# Patient Record
Sex: FEMALE | Race: WHITE
[De-identification: names, ages, dates, MRNs, and addresses within clinical notes are randomized per-mention and may not be internally consistent; named-entity substitution may affect disease eponyms.]

---

## 2019-01-09 ENCOUNTER — HOSPITAL ENCOUNTER (OUTPATIENT)
Dept: HOSPITAL 92 - SDC | Age: 79
Discharge: HOME | End: 2019-01-09
Attending: INTERNAL MEDICINE
Payer: MEDICARE

## 2019-01-09 DIAGNOSIS — F32.9: ICD-10-CM

## 2019-01-09 DIAGNOSIS — Z79.83: ICD-10-CM

## 2019-01-09 DIAGNOSIS — E11.9: ICD-10-CM

## 2019-01-09 DIAGNOSIS — K31.7: ICD-10-CM

## 2019-01-09 DIAGNOSIS — I48.91: ICD-10-CM

## 2019-01-09 DIAGNOSIS — Z79.899: ICD-10-CM

## 2019-01-09 DIAGNOSIS — F25.9: ICD-10-CM

## 2019-01-09 DIAGNOSIS — Z79.82: ICD-10-CM

## 2019-01-09 DIAGNOSIS — I69.354: ICD-10-CM

## 2019-01-09 DIAGNOSIS — K44.9: ICD-10-CM

## 2019-01-09 DIAGNOSIS — F41.9: ICD-10-CM

## 2019-01-09 DIAGNOSIS — E78.5: ICD-10-CM

## 2019-01-09 DIAGNOSIS — Z79.84: ICD-10-CM

## 2019-01-09 DIAGNOSIS — I10: ICD-10-CM

## 2019-01-09 DIAGNOSIS — K22.2: Primary | ICD-10-CM

## 2019-01-09 PROCEDURE — 0D757ZZ DILATION OF ESOPHAGUS, VIA NATURAL OR ARTIFICIAL OPENING: ICD-10-PCS | Performed by: INTERNAL MEDICINE

## 2019-01-09 PROCEDURE — 0DJ08ZZ INSPECTION OF UPPER INTESTINAL TRACT, VIA NATURAL OR ARTIFICIAL OPENING ENDOSCOPIC: ICD-10-PCS | Performed by: INTERNAL MEDICINE

## 2019-01-09 NOTE — HP
HISTORY OF PRESENT ILLNESS:  This is an 79-year-old  female who is a

nursing home resident.  The patient was referred to me by Dr. Faheem Montaño 
because

of dysphagia.  The patient is not a good historian; however, she has had the

dysphagia of recent onset.  The dysphagia occurs mostly with the solid foods 
like

meat and bread.  She had no painful swallowing.  She had undergone EGD because 
of

dysphagia. 



ALLERGIES:  SEROQUEL.



MEDICAL ILLNESSES:  

1. Hypertension.

2. Diabetes mellitus.

3. Schizoaffective disorder.

4. Atrial fibrillation.  

5. 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 

6. Depression.

7. Chronic anxiety.

8. Hyperlipidemia.



PHYSICAL EXAMINATION:

GENERAL:  Appears comfortable. 

VITAL SIGNS:  Pulse is 70, blood pressure 130/80. 

HEENT:  Conjunctivae are clear.   

CARDIOVASCULAR SYSTEM:  First and second heart sounds heard. 

LUNGS:  Clear to auscultation. 

ABDOMEN:  Soft.  Abdomen is nontender.  No organomegaly or masses. Bowel sounds 
are

normal. 



ADMITTING DIAGNOSES:  An 79-year-old  female with dysphagia.



PLAN:  EGD and possible dilation.







Job ID:  [id number]



[facility] none

## 2019-01-10 NOTE — OP
DATE OF PROCEDURE:  01/09/2019



OPERATIVE PROCEDURE:  

1. Esophagogastroduodenoscopy.

2. Esophageal dilation with Corona size 48 _ [place] citizen in diameter.



PREOPERATIVE DIAGNOSIS:  Dysphagia.



POSTOPERATIVE DIAGNOSES:  

1. No definite esophageal narrowing is seen, although she had a partial ring at 
the

mid esophagus. 

2. Hiatus hernia.

3. Gastritis, gastric polyp.

4. Duodenal polyp.



DESCRIPTION OF PROCEDURE:  The patient was placed on her left lateral position 
and

was given sedation by Anesthesia Department. A Pentax video gastroscope under 
direct

vision was passed down the oropharynx to the GE junction into the stomach and

subsequently into the descending duodenum. The vocal cords appeared healthy. The

esophageal mucosa appeared normal throughout the esophagus. At mid esophagus, 
the

patient was found to have partial ring, it is found to be tight. The scope was

advanced down without resistance. The patient had a hiatus hernia. Retroflexion

failed to show any pathology in the fundus or cardia. The gastric body, gastric

antrum show mild gastritis and gastric polyp. The duodenal bulb shows a polyp 
in the

bulb. This appears endoscopically benign. No biopsy was taken. In the descending

duodenum, no pathology seen. The stomach was decompressed and the scope was 
removed.

Given history of dysphagia, it was elected to pass a _48 fr [place] citizen Corona

dilator, passed down with out resistance.



DISCHARGE PLANNING:  This is an 79-year-old  female came for an EGD 
because

of dysphagia. The dysphagia was of recent onset. The patient had no  esophageal
  

narrowing seen and a    48 [place] citizen Corona dilator passed down with no

resistance. 



DISCHARGE RECOMMENDATION:  

1. Resume diet as before.

2. To call me, if she develops any chest pain, hematemesis, melena, or fever.

3. To see me back in the clinic in 2 weeks. If her symptoms persist, we will

consider a barium swallow and possibly modified barium swallow. 







Job ID:  [id number]



[facility]

## 2019-03-25 ENCOUNTER — HOSPITAL ENCOUNTER (OUTPATIENT)
Dept: HOSPITAL 18 - NAV CT | Age: 79
Discharge: HOME | End: 2019-03-25
Attending: INTERNAL MEDICINE
Payer: MEDICARE

## 2019-03-25 DIAGNOSIS — J90: ICD-10-CM

## 2019-03-25 DIAGNOSIS — M89.9: ICD-10-CM

## 2019-03-25 DIAGNOSIS — K44.9: ICD-10-CM

## 2019-03-25 DIAGNOSIS — E27.9: ICD-10-CM

## 2019-03-25 DIAGNOSIS — R79.89: Primary | ICD-10-CM

## 2019-03-25 PROCEDURE — 74177 CT ABD & PELVIS W/CONTRAST: CPT

## 2019-03-25 NOTE — CT
CT ABDOMEN AND PELVIS WITH IV CONTRAST:

 

HISTORY: 

Elevated LFTs, dementia, and Parkinson's disease.

 

COMPARISON: 

Comparison is made to the CT stone protocol of 4/30/2011.

 

FINDINGS: 

A moderate to large hiatal hernia is again seen.  There are bilateral pleural effusions, right larger
 than left, with adjacent consolidated changes on the right.  The patient is post cholecystectomy. 

 

No hepatic mass or abnormal biliary ductal dilatation is seen.  The liver measures 16.8 cm in length.
  The spleen, pancreas, right adrenal gland, and kidneys are normal.  A 1.5 cm left adrenal nodule is
 stable.

 

No free air, free fluid, or lymphadenopathy is seen in the abdomen or pelvis.  There are vascular yvonne
cifications without evidence of aneurysmal dilatation of the abdominal aorta.  Degenerative changes a
re present in the spine.  There are postop changes and metallic hardware in the proximal femurs.  The
 small bowel loops are not abnormally dilated.  There is fecal material in the rectosigmoid.

 

There is a 3.5 cm fluid collection posterior to the right proximal femur.  This may be due to postop 
change.  However, the possibility of infection cannot be excluded.  Clinical correlation is recommend
ed.

 

IMPRESSION: 

1.  Bilateral pleural effusions, right larger than left.  Right lower lung consolidation.

 

2.  Stable left adrenal nodule since 2011, likely adenoma.

 

3.  Hiatal hernia.

 

4.  A 2.5 cm fluid collection posterior to the right proximal femur as discussed above.

 

POS: [facility]

## 2019-04-02 ENCOUNTER — HOSPITAL ENCOUNTER (EMERGENCY)
Dept: HOSPITAL 18 - NAV ERS | Age: 79
Discharge: ANOTHER HOSPITAL | End: 2019-04-02
Payer: MEDICARE

## 2019-04-02 ENCOUNTER — HOSPITAL ENCOUNTER (INPATIENT)
Dept: HOSPITAL 92 - ERS | Age: 79
LOS: 3 days | Discharge: NURSING, REHAB OR LONG-TERM CARE | DRG: 177 | End: 2019-04-05
Attending: FAMILY MEDICINE | Admitting: FAMILY MEDICINE
Payer: MEDICARE

## 2019-04-02 VITALS — BODY MASS INDEX: 24 KG/M2

## 2019-04-02 DIAGNOSIS — F41.9: ICD-10-CM

## 2019-04-02 DIAGNOSIS — D64.9: ICD-10-CM

## 2019-04-02 DIAGNOSIS — E87.2: ICD-10-CM

## 2019-04-02 DIAGNOSIS — I50.23: ICD-10-CM

## 2019-04-02 DIAGNOSIS — I48.2: ICD-10-CM

## 2019-04-02 DIAGNOSIS — Z79.84: ICD-10-CM

## 2019-04-02 DIAGNOSIS — F31.9: ICD-10-CM

## 2019-04-02 DIAGNOSIS — Z86.73: ICD-10-CM

## 2019-04-02 DIAGNOSIS — E11.9: ICD-10-CM

## 2019-04-02 DIAGNOSIS — G20: ICD-10-CM

## 2019-04-02 DIAGNOSIS — Z95.0: ICD-10-CM

## 2019-04-02 DIAGNOSIS — I50.9: ICD-10-CM

## 2019-04-02 DIAGNOSIS — J69.0: Primary | ICD-10-CM

## 2019-04-02 DIAGNOSIS — K58.9: ICD-10-CM

## 2019-04-02 DIAGNOSIS — F02.80: ICD-10-CM

## 2019-04-02 DIAGNOSIS — J18.1: Primary | ICD-10-CM

## 2019-04-02 DIAGNOSIS — I11.0: ICD-10-CM

## 2019-04-02 DIAGNOSIS — K21.9: ICD-10-CM

## 2019-04-02 DIAGNOSIS — Z79.899: ICD-10-CM

## 2019-04-02 DIAGNOSIS — E78.5: ICD-10-CM

## 2019-04-02 DIAGNOSIS — E87.1: ICD-10-CM

## 2019-04-02 DIAGNOSIS — R74.0: ICD-10-CM

## 2019-04-02 DIAGNOSIS — F20.9: ICD-10-CM

## 2019-04-02 DIAGNOSIS — Z79.1: ICD-10-CM

## 2019-04-02 DIAGNOSIS — Z88.8: ICD-10-CM

## 2019-04-02 DIAGNOSIS — F03.90: ICD-10-CM

## 2019-04-02 DIAGNOSIS — E10.9: ICD-10-CM

## 2019-04-02 DIAGNOSIS — Z66: ICD-10-CM

## 2019-04-02 DIAGNOSIS — J96.01: ICD-10-CM

## 2019-04-02 DIAGNOSIS — I48.91: ICD-10-CM

## 2019-04-02 DIAGNOSIS — M81.0: ICD-10-CM

## 2019-04-02 LAB
ALBUMIN SERPL BCG-MCNC: 2.9 G/DL (ref 3.4–4.8)
ALP SERPL-CCNC: 161 U/L (ref 40–150)
ALT SERPL W P-5'-P-CCNC: 124 U/L (ref 8–55)
ANION GAP SERPL CALC-SCNC: 14 MMOL/L (ref 10–20)
AST SERPL-CCNC: 133 U/L (ref 5–34)
BACTERIA UR QL AUTO: (no result) HPF
BASOPHILS # BLD AUTO: 0 THOU/UL (ref 0–0.2)
BASOPHILS NFR BLD AUTO: 0.4 % (ref 0–1)
BILIRUB SERPL-MCNC: 0.7 MG/DL (ref 0.2–1.2)
BUN SERPL-MCNC: 15 MG/DL (ref 9.8–20.1)
CALCIUM SERPL-MCNC: 8.6 MG/DL (ref 7.8–10.44)
CHLORIDE SERPL-SCNC: 102 MMOL/L (ref 98–107)
CO2 SERPL-SCNC: 18 MMOL/L (ref 23–31)
CREAT CL PREDICTED SERPL C-G-VRATE: 0 ML/MIN (ref 70–130)
EOSINOPHIL # BLD AUTO: 0 THOU/UL (ref 0–0.7)
EOSINOPHIL NFR BLD AUTO: 0.2 % (ref 0–10)
GLOBULIN SER CALC-MCNC: 2.7 G/DL (ref 2.4–3.5)
GLUCOSE SERPL-MCNC: 135 MG/DL (ref 83–110)
HGB BLD-MCNC: 9.1 G/DL (ref 12–16)
IRON SERPL-MCNC: 14 UG/DL (ref 50–170)
LYMPHOCYTES # BLD AUTO: 0.4 THOU/UL (ref 1.2–3.4)
LYMPHOCYTES NFR BLD AUTO: 4.9 % (ref 21–51)
MCH RBC QN AUTO: 28.8 PG (ref 27–31)
MCV RBC AUTO: 95 FL (ref 78–98)
MONOCYTES # BLD AUTO: 0.7 THOU/UL (ref 0.11–0.59)
MONOCYTES NFR BLD AUTO: 8.7 % (ref 0–10)
NEUTROPHILS # BLD AUTO: 7.1 THOU/UL (ref 1.4–6.5)
NEUTROPHILS NFR BLD AUTO: 85.7 % (ref 42–75)
PLATELET # BLD AUTO: 213 THOU/UL (ref 130–400)
POTASSIUM SERPL-SCNC: 4.1 MMOL/L (ref 3.5–5.1)
PROT UR STRIP.AUTO-MCNC: 30 MG/DL
RBC # BLD AUTO: 3.17 MILL/UL (ref 4.2–5.4)
RBC UR QL AUTO: (no result) HPF (ref 0–3)
S PNEUM AG UR QL: NEGATIVE
SODIUM SERPL-SCNC: 130 MMOL/L (ref 136–145)
SP GR UR STRIP: 1.02 (ref 1–1.04)
UIBC SERPL-MCNC: 191 MCG/DL (ref 265–497)
WBC # BLD AUTO: 8.2 THOU/UL (ref 4.8–10.8)
WBC UR QL AUTO: (no result) HPF (ref 0–3)

## 2019-04-02 PROCEDURE — 81003 URINALYSIS AUTO W/O SCOPE: CPT

## 2019-04-02 PROCEDURE — 87899 AGENT NOS ASSAY W/OPTIC: CPT

## 2019-04-02 PROCEDURE — 51701 INSERT BLADDER CATHETER: CPT

## 2019-04-02 PROCEDURE — 85025 COMPLETE CBC W/AUTO DIFF WBC: CPT

## 2019-04-02 PROCEDURE — 93005 ELECTROCARDIOGRAM TRACING: CPT

## 2019-04-02 PROCEDURE — 96365 THER/PROPH/DIAG IV INF INIT: CPT

## 2019-04-02 PROCEDURE — 71045 X-RAY EXAM CHEST 1 VIEW: CPT

## 2019-04-02 PROCEDURE — 76705 ECHO EXAM OF ABDOMEN: CPT

## 2019-04-02 PROCEDURE — 93010 ELECTROCARDIOGRAM REPORT: CPT

## 2019-04-02 PROCEDURE — 80053 COMPREHEN METABOLIC PANEL: CPT

## 2019-04-02 PROCEDURE — 74230 X-RAY XM SWLNG FUNCJ C+: CPT

## 2019-04-02 PROCEDURE — 82728 ASSAY OF FERRITIN: CPT

## 2019-04-02 PROCEDURE — 87040 BLOOD CULTURE FOR BACTERIA: CPT

## 2019-04-02 PROCEDURE — 84145 PROCALCITONIN (PCT): CPT

## 2019-04-02 PROCEDURE — 83605 ASSAY OF LACTIC ACID: CPT

## 2019-04-02 PROCEDURE — 83550 IRON BINDING TEST: CPT

## 2019-04-02 PROCEDURE — 83880 ASSAY OF NATRIURETIC PEPTIDE: CPT

## 2019-04-02 PROCEDURE — 96360 HYDRATION IV INFUSION INIT: CPT

## 2019-04-02 PROCEDURE — 36416 COLLJ CAPILLARY BLOOD SPEC: CPT

## 2019-04-02 PROCEDURE — 36415 COLL VENOUS BLD VENIPUNCTURE: CPT

## 2019-04-02 PROCEDURE — 84484 ASSAY OF TROPONIN QUANT: CPT

## 2019-04-02 PROCEDURE — 83540 ASSAY OF IRON: CPT

## 2019-04-02 PROCEDURE — 81015 MICROSCOPIC EXAM OF URINE: CPT

## 2019-04-02 NOTE — PDOC.FPRHP
- History of Present Illness


Chief Complaint: cough


History of Present Illness: 





80yo F with pmh of CVA and dementia presents from nursing home with 2 week hx 

of cough. Pt is difficult to understand and hx was limited 2/2 this and being a 

difficult historian. Pt endorses 2 week hx of increasing cough, subjective 

chills, and 1 or 2 episodes of watery vomiting. Pt denies fevers and sick 

contacts.


ED Course: 





1L IVF, Rocephin, azithromycin





- Allergies/Adverse Reactions


 Allergies











Allergy/AdvReac Type Severity Reaction Status Date / Time


 


lactose Allergy   Unverified 04/02/19 21:29


 


quetiapine [From Seroquel] Allergy   Unverified 04/02/19 21:29














- Home Medications


 











 Medication  Instructions  Recorded  Confirmed  Type


 


Aripiprazole [Abilify] 50 mg PO HS 06/28/13 04/03/19 History


 


Calcium 600 + Vit D Tablet 1 tab PO DAILY 06/28/13 04/03/19 History


 


Aluminum & Magnesium Hydroxide 30 ml PO Q4HR PRN 03/20/18 04/03/19 History





[Maalox]    


 


Acetaminophen [Tylenol Regular 650 mg PO Q6H PRN  tab 03/23/18 04/03/19 Rx





Strength]    


 


Magnesium Hydroxide [Milk of 400 mg PO BID PRN #0 03/23/18 04/03/19 Rx





Magnesia]    


 


Loperamide HCl [Loperamide] 2 mg PO ASDIR PRN 10/01/18 04/03/19 History


 


guaiFENesin/Dextromethorphan 5 ml PO Q6H PRN 10/01/18 04/03/19 History





[Guaifenesin Dm Syrup]    


 


Alendronate Sodium [Fosamax] 70 mg PO Q7D #4 tab 10/06/18 04/03/19 Rx


 


Amantadine HCl [Amantadine] 100 mg PO BID #60 capsule 10/06/18 04/03/19 Rx


 


Amiodarone [Cordarone] 200 mg PO BID 21 Days #42 tab 10/06/18 04/03/19 Rx


 


Atorvastatin Calcium [Lipitor] 40 mg PO HS #30 tab 10/06/18 04/03/19 Rx


 


Dicyclomine [Bentyl] 20 mg PO TID #90 tab 10/06/18 04/03/19 Rx


 


Meclizine HCl [Antivert] 25 mg PO TID #90 tab 10/06/18 04/03/19 Rx


 


Oxybutynin Chloride 5 mg PO HS #30 tablet 10/06/18 04/03/19 Rx


 


Potassium Chloride [K-Dur] 10 meq PO QAM-WM #15 tab 10/06/18 04/03/19 Rx


 


Sertraline HCl [Zoloft] 200 mg PO DAILY #60 tab 10/06/18 04/03/19 Rx


 


busPIRone HCl [Buspirone HCl] 10 mg PO BID #60 tablet 10/06/18 04/03/19 Rx


 


metFORMIN [Glucophage] 500 mg PO QAM-WM #30 tab 10/06/18 04/03/19 Rx


 


Amoxicillin/Potassium Clav 875 mg PO Q12HR 7 Days #14 tab 04/05/19  Rx





[Augmentin]    


 


Metoprolol Tartrate [Lopressor] 12.5 mg PO BID 30 Days #60 tab 04/05/19  Rx














- History


PMHx: Parkinson's, Dementia, bipolar, A-fib s/p pacemaker, DMII, GERD, HLD, HTN

, osteoporosis


 


PSHx: b/l hip replacement





FHx: Mother- MI


 


Social: Denies tobacco, alcohol or drug use. Lives in a nursing facility








- Review of Systems


General: reports: other (no fever, chills).  denies: fatigue


Eyes: denies: eye pain, vision changes


ENT: denies: nasal congestion, rhinorrhea


Respiratory: reports: cough, shortness of breath


Cardiovascular: denies: chest pain, palpitation, edema


Gastrointestinal: reports: vomiting.  denies: diarrhea, constipation


Genitourinary: denies: incontinence, dysuria


Skin: denies: rashes, lesions


Musculoskeletal: denies: pain, tenderness


Neurological: denies: syncope, seizure


Psychological: denies: anxiety, depression





- Vital signs


BP: 155/75, Pulse: 75, Resp: 22, Temp: 98.0 (Oral), O2 sat: 98 on 2L O2, Time: 4 /2/2019 17:40.





Weight: 49kg





- Physical Exam


Constitutional: awake, alert and oriented, other (appears fatigued)


HEENT: normocephalic and atraumatic, EOMI, grossly normal vision, grossly 

normal hearing


Neck: supple, trachea midline


Chest: no-tender to palpation


Heart: RRR, normal S1/S2, other (+1 pitting edema in bilat LEs)


Lungs: no respiratory distress, other (bilateral inspiratory and expiratory 

crackles)


Abdomen: soft, non-tender


Musculoskeletal: normal structure, normal tone


Neurological: no focal deficit, normal sensation


Skin: good turgor, capillary refill <2 seconds


Heme/Lymphatic: no unusual bruising or bleeding, no purpura


Psychiatric: normal mood and affect, good judgment and insight





FMR H&P: Results





- Labs


Result Diagrams: 


 04/05/19 05:23





 04/05/19 05:23


Lab results: 


 











Lactic Acid  2.8 mmol/L (0.5-2.2)  H  04/02/19  17:57    














FMR H&P: A/P





- Problem List


(1) Pneumonia


Status: Acute   Code(s): J18.9 - PNEUMONIA, UNSPECIFIED ORGANISM   





(2) Fluid overload


Status: Acute   Code(s): E87.70 - FLUID OVERLOAD, UNSPECIFIED   





(3) Transaminitis


Status: Acute   Code(s): R74.0 - NONSPEC ELEV OF LEVELS OF TRANSAMNS & LACTIC 

ACID DEHYDRGNSE   





(4) Hyponatremia


Status: Acute   Code(s): E87.1 - HYPO-OSMOLALITY AND HYPONATREMIA   





(5) Lactic acidosis


Status: Acute   Code(s): E87.2 - ACIDOSIS   





(6) Normocytic anemia


Status: Acute   Code(s): D64.9 - ANEMIA, UNSPECIFIED   





(7) Anxiety and depression


Status: Chronic   Code(s): F41.9 - ANXIETY DISORDER, UNSPECIFIED; F32.9 - MAJOR 

DEPRESSIVE DISORDER, SINGLE EPISODE, UNSPECIFIED   





(8) Atrial fibrillation


Status: Chronic   Code(s): I48.91 - UNSPECIFIED ATRIAL FIBRILLATION   


Qualifiers: 


   Atrial fibrillation type: chronic   Qualified Code(s): I48.2 - Chronic 

atrial fibrillation   





(9) Diabetes type 2, controlled


Status: Chronic   Code(s): E11.9 - TYPE 2 DIABETES MELLITUS WITHOUT 

COMPLICATIONS   





(10) Dyslipidemia


Status: Chronic   Code(s): E78.5 - HYPERLIPIDEMIA, UNSPECIFIED   





(11) HTN (hypertension)


Status: Chronic   Code(s): I10 - ESSENTIAL (PRIMARY) HYPERTENSION   





- Plan


Acute hypoxic respiratory failure 2/2 Pneumonia, likely aspiration


A- Pt has impressive RLL pneumonia on CXR though exam is not indicative of 

this. Pt is requiring O2 and was tachypneic in ER but otherwise vitals and WBC 

are normal and not meeting SIRS criteria. LA 3.6--> 2.8. Rocephin and azithro 

given in ED, BCx not collected.


P- will switch to zosyn for aspiration pneumonia coverage


- urine strep pneumonia and legionella


- speech therapy consult for swallow studies and speech eval


- monitor CBC





Fluid overload


A- possibly 2/2 diastolic CHF.  (previous values range ), Echo in 

10/2018 shows EF of 60-65% with no comment on diastolic function. Exam 

consistent with fluid overload.


P- one time dose lasix 40mg IV


- I/Os strict


- daily weights





Transaminitis


A- pt has had negative hepatitis studies outpt per ER physicians note in [place].


P- will order RUQ US





Hyponatremia


A- likely hypervolemic


P- lasix per plan above


- repeat AM bmp





lactic acidosis


A- LA trending down, possibly 2/2 poor perfusion to tissues


P- will get one more LA





normocytic anemia


A- Hgb 9.1, MCV 91


P- iron studies





Parkinson's


- MD aware





Dementia


- MD aware





bipolar


- home meds





A-fib s/p pacemaker


-home meds





DMII


-home meds





GERD


-home meds





HLD


-home meds





HTN


-home meds





CODE: DNAR, discussed at length with pt





FMR H&P: Upper Level





- Pertinent history


Catrina Álvarez is an 79 year old female who presented to the [place] ED from NH 

due to respiratory distress and hypoxia. She was noted to have an oxygen 

saturation of 84% at the nursing home. 





- Pertinent findings





Exam:


General: alert and oriented x 3; in no distress


Heart: regular rate and rhythm, no murmurs, rubs, gallops.


Lungs: bibasilar crackles present


Extremities: 1+ pitting edema bilaterally.





Labs and imaging as described above.





- Plan


Date/Time: 04/02/19 2766








Maryan FAULKNER, have evaluated this patient and agree with findings/plan as 

outlined by intern resident. Pertinent changes/additions are listed here.





Acute hypoxic respiratory failure


- likely secondary to aspiration pneumonia/pneumonitis vs. Pulmonary edema


- continue supplemental oxygen.


- will administer Lasix x1 and 





Pulmonary edema





Likely aspiration pneumonia vs. pneumonitis


- will consult ST for bedside swallow.


- Of note, pt evaluated for dysphagia in 1/2019. Had EGD with dilation of 

esophageal ring.


- procal.


- continue Zosyn pending procal.





Mild hyponatremia


- likely related to volume overload.


- will recheck in AM with diuresis.





Normocytic anemia


- iron studies.








Elevated liver enzymes


- Per ER report, pt's pcp has done hepatitis studies outpatient.


- CT ab/pelvis was ordered on 3/25 and did not show any hepatobiliary 

abnormalities.





Addendum - Attending





- Attending Attestation


Date/Time: 04/07/19 8831





I personally evaluated the patient and discussed the management with Dr. Persaud at time of admission.


I agree with the History, Examination, Assessment and Plan documented above 

with any addition or exceptions noted below.

## 2019-04-02 NOTE — RAD
FFrontal radiograph chest:

4/2/2019



COMPARISON: 3/21/2018



HISTORY: Dyspnea, shortness of breath, nervousness



FINDINGS: No pneumothorax. 



Pulmonary vascular congestion and perihilar/bibasilar interstitial prominence. 



Focal opacity in the right base with obscuration of the right hemidiaphragm, right heart border, and 
right costophrenic angle suggesting nonspecific right basilar consolidation/collapse and right pleura
l fluid. 



Dual lead transvenous pacing device present, inserted via a left subclavian approach.



IMPRESSION: Findings suggesting pulmonary edema. Dense opacity in right base suggests associated cons
olidation/collapse, pleural density, and/or infectious pneumonitis/aspiration. Follow-up imaging foll
owing treatment to document resolution is advised. Of note, findings are similar when compared to the
 3/20/2018 examination



Reported By: Ruy Alarcon 

Electronically Signed:  4/2/2019 2:05 PM

## 2019-04-03 LAB
ALBUMIN SERPL BCG-MCNC: 2.5 G/DL (ref 3.4–4.8)
ALP SERPL-CCNC: 140 U/L (ref 40–150)
ALT SERPL W P-5'-P-CCNC: 86 U/L (ref 8–55)
ANION GAP SERPL CALC-SCNC: 11 MMOL/L (ref 10–20)
AST SERPL-CCNC: 80 U/L (ref 5–34)
BASOPHILS # BLD AUTO: 0.1 THOU/UL (ref 0–0.2)
BASOPHILS NFR BLD AUTO: 1 % (ref 0–1)
BILIRUB SERPL-MCNC: 0.8 MG/DL (ref 0.2–1.2)
BUN SERPL-MCNC: 14 MG/DL (ref 9.8–20.1)
CALCIUM SERPL-MCNC: 8.1 MG/DL (ref 7.8–10.44)
CHLORIDE SERPL-SCNC: 103 MMOL/L (ref 98–107)
CO2 SERPL-SCNC: 23 MMOL/L (ref 23–31)
CREAT CL PREDICTED SERPL C-G-VRATE: 60 ML/MIN (ref 70–130)
EOSINOPHIL # BLD AUTO: 0.1 THOU/UL (ref 0–0.7)
EOSINOPHIL NFR BLD AUTO: 1.3 % (ref 0–10)
GLOBULIN SER CALC-MCNC: 2.8 G/DL (ref 2.4–3.5)
GLUCOSE SERPL-MCNC: 93 MG/DL (ref 83–110)
HGB BLD-MCNC: 8.3 G/DL (ref 12–16)
LYMPHOCYTES # BLD: 0.3 THOU/UL (ref 1.2–3.4)
LYMPHOCYTES NFR BLD AUTO: 6.3 % (ref 21–51)
MCH RBC QN AUTO: 30.3 PG (ref 27–31)
MCV RBC AUTO: 95 FL (ref 78–98)
MONOCYTES # BLD AUTO: 0.6 THOU/UL (ref 0.11–0.59)
MONOCYTES NFR BLD AUTO: 11 % (ref 0–10)
NEUTROPHILS # BLD AUTO: 4 THOU/UL (ref 1.4–6.5)
NEUTROPHILS NFR BLD AUTO: 80.4 % (ref 42–75)
PLATELET # BLD AUTO: 193 THOU/UL (ref 130–400)
POTASSIUM SERPL-SCNC: 3.5 MMOL/L (ref 3.5–5.1)
RBC # BLD AUTO: 2.72 MILL/UL (ref 4.2–5.4)
SODIUM SERPL-SCNC: 133 MMOL/L (ref 136–145)
WBC # BLD AUTO: 5 THOU/UL (ref 4.8–10.8)

## 2019-04-03 RX ADMIN — Medication SCH ML: at 20:44

## 2019-04-03 RX ADMIN — Medication SCH ML: at 08:28

## 2019-04-03 NOTE — RAD
FXR Ba Swallow W/Speech Therap



History: [Dysphagia, unspecified R 13.10

Feeding difficulties R 63.3]



Comparison: None.



Findings: Multiple consistency contrast was administered to the patient via the speech pathologist. T
here is penetration and aspiration with thin liquid contrast by spoon with penetration with nectar an
d puree contrast by spoon.



Impression: Fluoroscopy for the speech pathologist. Please see their report for details of the exam.



Fluoroscopy time: 0.7 minute

Dose area product: 0.17 Gray centimeter squared



Reported By: Theo Garcia 

Electronically Signed:  4/3/2019 2:00 PM

## 2019-04-03 NOTE — RAD
FPortable chest radiograph:

4/3/2019



COMPARISON: 4/2/2019



HISTORY:Pneumonitis



FINDINGS: Stable dual lead transvenous pacing device. Heart and mediastinal contours are stable. Pers
istent increased interstitial density in the perihilar regions and both lung bases with superimposed 
bilateral perihilar and bilateral basilar airspace disease. Dense opacity persists in the right lung 
base consistent with right basilar consolidation/collapse and right pleural fluid.





IMPRESSION: No significant interval change. Findings suggest pulmonary edema and/or multifocal infect
ious pneumonitis/aspiration. Follow-up following treatment to document resolution are advised.



Reported By: Ruy Alarcon 

Electronically Signed:  4/3/2019 7:52 AM

## 2019-04-03 NOTE — ULT
RIGHT UPPER QUADRANT ULTRASOUND:

 

Date:  04/03/19 

 

INDICATION:

Elevated liver function enzymes. 

 

FINDINGS:

There is no focal hepatic lesion. Gallbladder is not visualized for comment. No ascites. Common duct 
is normal in size at 5-6 mm. 

 

IMPRESSION: 

1.  Nonvisualization of gallbladder. Correlate clinically. 

2.  Otherwise no acute process is visualized within the right upper quadrant. 

 

 

POS: [facility]

## 2019-04-03 NOTE — PDOC.FM
- Subjective


Subjective: 





This morning patient is Axox3 with expressive aphasia which appears to be 

baseline from review. This morning she states she had some cough last night but 

is not feeling short of breath. She denies fevers, chills, sweats. She denies N/

V/D.





- Objective


Vital Signs & Weight: 


 Vital Signs (12 hours)











  Temp Pulse Resp BP Pulse Ox


 


 04/03/19 04:00  97.9 F  66  18  115/56 L  99


 


 04/03/19 00:00  98.8 F  85  18  134/63  94 L


 


 04/02/19 19:36  98.7 F  85  18  148/64 H  94 L








 Weight











Weight                         59.6 kg














I&O: 


 











 04/01/19 04/02/19 04/03/19





 06:59 06:59 06:59


 


Output Total   400


 


Balance   -400











Result Diagrams: 


 04/03/19 06:35





 04/03/19 06:35





Phys Exam





- Physical Examination


Constitutional: NAD


HEENT: PERRLA, moist MMs


Respiratory: no wheezing


decreased breath sounds at bases bilaterally


Cardiovascular: RRR, no significant murmur


Gastrointestinal: soft, non-tender, no distention, positive bowel sounds


Musculoskeletal: no edema, pulses present


Neurological: moves all 4 limbs


mild expressive aphasia


Psychiatric: normal affect, A&O x 3


Skin: no rash, cap refill <2 seconds





Dx/Plan


(1) Fluid overload


Code(s): E87.70 - FLUID OVERLOAD, UNSPECIFIED   Status: Acute   





(2) Hyponatremia


Code(s): E87.1 - HYPO-OSMOLALITY AND HYPONATREMIA   Status: Acute   





(3) Lactic acidosis


Code(s): E87.2 - ACIDOSIS   Status: Acute   





(4) Normocytic anemia


Code(s): D64.9 - ANEMIA, UNSPECIFIED   Status: Acute   





(5) Pneumonia


Code(s): J18.9 - PNEUMONIA, UNSPECIFIED ORGANISM   Status: Acute   





(6) Transaminitis


Code(s): R74.0 - NONSPEC ELEV OF LEVELS OF TRANSAMNS & LACTIC ACID DEHYDRGNSE   

Status: Acute   





- Plan


Plan: 





# Acute hypoxic respiratory failure 2/2 fluid overload and likely asp pna


- ST Nai vinson for coverage of aspiration


- bcx from [place] ED, will follow


- procal 0.48, WBC 5.0


- off o2 this AM


- lasix 40mg IV BID





# Aspiration risk, hx CVA


- Of note, pt evaluated for dysphagia in 1/2019. Had EGD with dilation of 

esophageal ring.





# Mild hyponatremia


- improved to 133





# Normocytic anemia


- iron studies.





# Dementia





# Elevated liver enzymes


- Per ER report, pt's pcp has done hepatitis studies outpatient.


- CT ab/pelvis was ordered on 3/25 and did not show any hepatobiliary 

abnormalities.


- RUQ US shows no acute process





Fluids: tko


PPx: lovenox


Diet: regular


Dispo: 2 nights





Addendum - Attending





- Attending Attestation


Date/Time: 04/03/19 2793





I personally evaluated the patient and discussed the management with Dr. Mancia.


I agree with the History, Examination, Assessment and Plan documented above 

with any addition or exceptions noted below.


Patient will have speech therapy evaluation to clear her for swallowing.  

Continue lasix for diuresis.  Pt is weaned off O2.  On zosyn.

## 2019-04-03 NOTE — PDOC.EVN
Event Note





- Event Note


Event Note: 








Recieved news from  that based on MBBS she could not really swallow anything 

safely and prognosis for recovery or ability to swallow safely was slim with 

continued therapy





Discussed this with patient. In short stated there were two routes, one being 

comfort feeds and be as safe as possible knowing pneumonia will come back at 

some point and could very cause death.





Patient is AxOx3 and by provider's judgement can clearly make her own 

decisions. She decided she is not at all interested in a tube as she had a 

relative who languished on tube for some time. She says "whats the point of 

that."





Call Donal Carpenter her brother and support person


[phone]





He agrees with plan and would like update tomorrow.

## 2019-04-04 LAB
ALBUMIN SERPL BCG-MCNC: 2.6 G/DL (ref 3.4–4.8)
ALP SERPL-CCNC: 167 U/L (ref 40–150)
ALT SERPL W P-5'-P-CCNC: 76 U/L (ref 8–55)
ANION GAP SERPL CALC-SCNC: 11 MMOL/L (ref 10–20)
AST SERPL-CCNC: 66 U/L (ref 5–34)
BASOPHILS # BLD AUTO: 0 THOU/UL (ref 0–0.2)
BASOPHILS NFR BLD AUTO: 0 % (ref 0–1)
BILIRUB SERPL-MCNC: 0.6 MG/DL (ref 0.2–1.2)
BUN SERPL-MCNC: 11 MG/DL (ref 9.8–20.1)
CALCIUM SERPL-MCNC: 8.2 MG/DL (ref 7.8–10.44)
CHLORIDE SERPL-SCNC: 99 MMOL/L (ref 98–107)
CO2 SERPL-SCNC: 26 MMOL/L (ref 23–31)
CREAT CL PREDICTED SERPL C-G-VRATE: 58 ML/MIN (ref 70–130)
EOSINOPHIL # BLD AUTO: 0.1 THOU/UL (ref 0–0.7)
EOSINOPHIL NFR BLD AUTO: 2.8 % (ref 0–10)
GLOBULIN SER CALC-MCNC: 3.1 G/DL (ref 2.4–3.5)
GLUCOSE SERPL-MCNC: 92 MG/DL (ref 83–110)
HGB BLD-MCNC: 9.1 G/DL (ref 12–16)
LYMPHOCYTES # BLD: 0.4 THOU/UL (ref 1.2–3.4)
LYMPHOCYTES NFR BLD AUTO: 10.1 % (ref 21–51)
MCH RBC QN AUTO: 29.9 PG (ref 27–31)
MCV RBC AUTO: 92.8 FL (ref 78–98)
MONOCYTES # BLD AUTO: 0.3 THOU/UL (ref 0.11–0.59)
MONOCYTES NFR BLD AUTO: 9.1 % (ref 0–10)
NEUTROPHILS # BLD AUTO: 2.9 THOU/UL (ref 1.4–6.5)
NEUTROPHILS NFR BLD AUTO: 78.1 % (ref 42–75)
PLATELET # BLD AUTO: 209 THOU/UL (ref 130–400)
POTASSIUM SERPL-SCNC: 3.3 MMOL/L (ref 3.5–5.1)
RBC # BLD AUTO: 3.03 MILL/UL (ref 4.2–5.4)
SODIUM SERPL-SCNC: 133 MMOL/L (ref 136–145)
WBC # BLD AUTO: 3.7 THOU/UL (ref 4.8–10.8)

## 2019-04-04 RX ADMIN — Medication SCH ML: at 20:05

## 2019-04-04 RX ADMIN — Medication SCH ML: at 08:06

## 2019-04-04 NOTE — PQF
DATE:    4-4-19                                      ATTN:  DR. JAYY JUAREZ / DR. ANA LILIA SANCHEZ



Please exercise your independent, professional judgment in responding to the 
clarification form. 

Clinical indicators are provided on the bottom of this form for your review



Please check appropriate box(s):



HEART FAILURE:

A.  TYPE:

             [  x] Systolic / HFrEF      [  ] Diastolic / HFpEF       [  ] 
Combined Systolic / Diastolic

B.  ACUITY

             [  ] Acute          [ x ] Acute on Chronic          [  ] Chronic

[  ] Other diagnosis ___________

[  ] Unable to determine



In addition, please specify:

Present on Admission (POA):  [x  ] Yes             [  ] No             [  ] 
Unable to determine



For continuity of documentation, please document condition throughout progress 
notes and discharge summary.  Thank You.



CLINICAL INDICATORS - SIGNS / SYMPTOMS / LABS



  H&P:   ACUTE FLUID OVERLOAD,  POSSIBLY 2/2 DIASTOLIC CHF.  ,  EXAM 
CONSISTENT 

       WITH FLUID OVERLOAD.  ONE TIME DOSE LASIX 40MG IV, I/O'S STRICT, DAILY 
WEIGHTS,  PULMONARY EDEMA



CXR:   4-3-19:   FINDINGS SUGGESTIVE PULMONARY EDEMA AND/OR MULTIFOCAL 
INFECTIOUS 

       PNEUMONITIS/ASPIRATION



RISKS:  



    H&P:   HX CVA,  A FIB,  DM 2,  DYSLIPIDEMIA, HTN



TREATMENTS:  

 

   H&P:  ONE TIME DOSE LASIX 40MG IV, I/O'S STRICT, DAILY WEIGHTS,

   MAR:   LASIX 40MG PO BID/ IV



(This form is maintained as a part of the permanent medical record)

 2015 [employer].  All Rights Reserved

[email or web address]    Office:  [phone]

                                                              

[facility]

## 2019-04-04 NOTE — PDOC.FM
- Subjective


Subjective: 





This morning the patient states she is feeling better. Her SOB is improved. She 

tolerate PO intake without difficulty last night per her report. Denies N/V/D, 

cough. Denies any pain this AM.





- Objective


Vital Signs & Weight: 


 Vital Signs (12 hours)











  Temp Pulse Resp BP Pulse Ox


 


 04/04/19 07:21  97.6 F  107 H  18  131/79  91 L








 Weight











Weight                         59.6 kg














I&O: 


 











 04/03/19 04/04/19 04/05/19





 06:59 06:59 06:59


 


Intake Total 200  


 


Output Total 400  


 


Balance -200  











Result Diagrams: 


 04/04/19 07:08





 04/04/19 07:08





Phys Exam





- Physical Examination


Constitutional: NAD


HEENT: moist MMs, sclera anicteric


Respiratory: no wheezing, clear to auscultation bilateral


Cardiovascular: RRR, no significant murmur, no rub


Gastrointestinal: soft, non-tender, no distention, positive bowel sounds


Musculoskeletal: no edema, pulses present


Neurological: moves all 4 limbs


mild expressive aphasia


Psychiatric: normal affect, A&O x 3


Skin: no rash, cap refill <2 seconds





Dx/Plan


(1) Fluid overload


Code(s): E87.70 - FLUID OVERLOAD, UNSPECIFIED   Status: Acute   





(2) Hyponatremia


Code(s): E87.1 - HYPO-OSMOLALITY AND HYPONATREMIA   Status: Acute   





(3) Lactic acidosis


Code(s): E87.2 - ACIDOSIS   Status: Acute   





(4) Normocytic anemia


Code(s): D64.9 - ANEMIA, UNSPECIFIED   Status: Acute   





(5) Pneumonia


Code(s): J18.9 - PNEUMONIA, UNSPECIFIED ORGANISM   Status: Acute   





(6) Transaminitis


Code(s): R74.0 - NONSPEC ELEV OF LEVELS OF TRANSAMNS & LACTIC ACID DEHYDRGNSE   

Status: Acute   





- Plan


Plan: 





# Acute hypoxic respiratory failure 2/2 fluid overload and asp pna


- ST Nai vinson for coverage of aspiration


- bcx from [place] ED, NGTD taken 4/2/19 at 1400


- procal 0.48, WBC 5.0


- off o2 this AM


- lasix 40mg PO





# Aspiration risk, hx CVA


- Of note, pt evaluated for dysphagia in 1/2019. Had EGD with dilation of 

esophageal ring.


- Had MBBS 4/3, not safe for any PO intake, discussed this with patient elected 

for comfort feeds, see event note


- palliative care consulted to discuss further goals of care


- brother, jesus davila, [phone] as support person





# Mild hyponatremia


- improved to 133





# Normocytic anemia


- iron studies





# Dementia





# Elevated liver enzymes


- Per ER report, pt's pcp has done hepatitis studies outpatient.


- CT ab/pelvis was ordered on 3/25 and did not show any hepatobiliary 

abnormalities


- RUQ US shows no acute process





Fluids: tko


PPx: lovenox


Diet: regular


Dispo: 1-2 nights








Addendum - Attending





- Attending Attestation


Date/Time: 04/04/19 1941





I personally evaluated the patient and discussed the management with Dr. Mancia.


I agree with the History, Examination, Assessment and Plan documented above 

with any addition or exceptions noted below.


Pt is now on comfort feed following speech therapy eval yesterday.  Pt is on 

antibiotics today and labs are pending.

## 2019-04-04 NOTE — EKG
Test Reason : 

Blood Pressure : ***/*** mmHG

Vent. Rate : 118 BPM     Atrial Rate : 118 BPM

   P-R Int : 080 ms          QRS Dur : 134 ms

    QT Int : 384 ms       P-R-T Axes : 000 050 -86 degrees

   QTc Int : 538 ms

 

Electronic ventricular pacemaker

When compared with ECG of 01-OCT-2018 14:15,

Electronic ventricular pacemaker has replaced Junctional rhythm

Vent. rate has increased BY  70 BPM

Confirmed by CARLITO LAST,  [place] ([id number]) on 4/4/2019 9:25:22 PM

 

Referred By:  ANN           Confirmed By:DR. PRUDENCIO ESTEBAN MD

## 2019-04-05 VITALS — DIASTOLIC BLOOD PRESSURE: 62 MMHG | SYSTOLIC BLOOD PRESSURE: 98 MMHG | TEMPERATURE: 98.8 F

## 2019-04-05 LAB
ALBUMIN SERPL BCG-MCNC: 2.6 G/DL (ref 3.4–4.8)
ALP SERPL-CCNC: 199 U/L (ref 40–150)
ALT SERPL W P-5'-P-CCNC: 77 U/L (ref 8–55)
ANION GAP SERPL CALC-SCNC: 11 MMOL/L (ref 10–20)
AST SERPL-CCNC: 83 U/L (ref 5–34)
BASOPHILS # BLD AUTO: 0 THOU/UL (ref 0–0.2)
BASOPHILS NFR BLD AUTO: 0.5 % (ref 0–1)
BILIRUB SERPL-MCNC: 0.6 MG/DL (ref 0.2–1.2)
BUN SERPL-MCNC: 11 MG/DL (ref 9.8–20.1)
CALCIUM SERPL-MCNC: 8.4 MG/DL (ref 7.8–10.44)
CHLORIDE SERPL-SCNC: 98 MMOL/L (ref 98–107)
CO2 SERPL-SCNC: 29 MMOL/L (ref 23–31)
CREAT CL PREDICTED SERPL C-G-VRATE: 50 ML/MIN (ref 70–130)
EOSINOPHIL # BLD AUTO: 0.1 THOU/UL (ref 0–0.7)
EOSINOPHIL NFR BLD AUTO: 3.5 % (ref 0–10)
GLOBULIN SER CALC-MCNC: 3.2 G/DL (ref 2.4–3.5)
GLUCOSE SERPL-MCNC: 103 MG/DL (ref 83–110)
HGB BLD-MCNC: 10.1 G/DL (ref 12–16)
LYMPHOCYTES # BLD: 0.5 THOU/UL (ref 1.2–3.4)
LYMPHOCYTES NFR BLD AUTO: 15.4 % (ref 21–51)
MCH RBC QN AUTO: 30.1 PG (ref 27–31)
MCV RBC AUTO: 94.5 FL (ref 78–98)
MONOCYTES # BLD AUTO: 0.4 THOU/UL (ref 0.11–0.59)
MONOCYTES NFR BLD AUTO: 13.1 % (ref 0–10)
NEUTROPHILS # BLD AUTO: 2.1 THOU/UL (ref 1.4–6.5)
NEUTROPHILS NFR BLD AUTO: 67.5 % (ref 42–75)
PLATELET # BLD AUTO: 226 THOU/UL (ref 130–400)
POTASSIUM SERPL-SCNC: 3.5 MMOL/L (ref 3.5–5.1)
RBC # BLD AUTO: 3.35 MILL/UL (ref 4.2–5.4)
SODIUM SERPL-SCNC: 134 MMOL/L (ref 136–145)
WBC # BLD AUTO: 3.1 THOU/UL (ref 4.8–10.8)

## 2019-04-05 RX ADMIN — Medication SCH: at 10:25

## 2019-04-05 NOTE — PDOC.FM
- Subjective


Subjective: 





Patient staets she is feeling well this AM. No decreased appetite, N/V/D. She 

denies SOB or cough. No CP or palpitations. 





- Objective


Vital Signs & Weight: 


 Vital Signs (12 hours)











  Temp Pulse Resp BP Pulse Ox


 


 04/05/19 04:00  97.5 F L  110 H  18  112/72  100


 


 04/05/19 00:37  98.1 F  112 H  18  120/80  99


 


 04/04/19 20:00  97.7 F  108 H  18  130/84  100








 Weight











Weight                         59.6 kg














I&O: 


 











 04/03/19 04/04/19 04/05/19





 06:59 06:59 06:59


 


Intake Total 200  720


 


Output Total 400  


 


Balance -200  720











Result Diagrams: 


 04/05/19 05:23





 04/05/19 05:23





Phys Exam





- Physical Examination


Constitutional: NAD


HEENT: moist MMs, sclera anicteric


Respiratory: no wheezing, clear to auscultation bilateral


Cardiovascular: no rub


irregular irregular rhythm, 2/6 murmur


Gastrointestinal: soft, non-tender, no distention, positive bowel sounds


Musculoskeletal: no edema, pulses present


Neurological: moves all 4 limbs


mild expressive aphasia


Psychiatric: normal affect, A&O x 3


Skin: no rash, cap refill <2 seconds





Dx/Plan


(1) Fluid overload


Code(s): E87.70 - FLUID OVERLOAD, UNSPECIFIED   Status: Acute   





(2) Hyponatremia


Code(s): E87.1 - HYPO-OSMOLALITY AND HYPONATREMIA   Status: Acute   





(3) Lactic acidosis


Code(s): E87.2 - ACIDOSIS   Status: Acute   





(4) Normocytic anemia


Code(s): D64.9 - ANEMIA, UNSPECIFIED   Status: Acute   





(5) Pneumonia


Code(s): J18.9 - PNEUMONIA, UNSPECIFIED ORGANISM   Status: Acute   





(6) Transaminitis


Code(s): R74.0 - NONSPEC ELEV OF LEVELS OF TRANSAMNS & LACTIC ACID DEHYDRGNSE   

Status: Acute   





- Plan


Plan: 








# Tachycardia


- "pacemaker replaced junctional rhythm"


- on amio po


- added metoprolol 12.5 mg BID





# Acute hypoxic respiratory failure 2/2 fluid overload and asp pna


- ST consulted, Zosyn for coverage of aspiration-> switched to augmentin today


- bcx from [place] [facility], Negative, take 4/2/19 at 1400


- procal 0.48, WBC 5.0


- off o2 this AM


- lasix 40mg PO





# Aspiration risk, hx CVA


- Of note, pt evaluated for dysphagia in 1/2019. Had EGD with dilation of 

esophageal ring.


- Had MBBS 4/3, not safe for any PO intake, discussed this with patient elected 

for comfort feeds, see event note


- palliative care consulted to discuss further goals of care


- brother, jesus davila, [phone] as support person





# Mild hyponatremia


- improved to 133





# Normocytic anemia


- iron studies





# Dementia





# Elevated liver enzymes


- Per ER report, pt's pcp has done hepatitis studies outpatient.


- CT ab/pelvis was ordered on 3/25 and did not show any hepatobiliary 

abnormalities


- RUQ US shows no acute process





Fluids: tko


PPx: lovenox


Diet: regular


Dispo: d/c pending control of HR








Addendum - Attending





- Attending Attestation


Date/Time: 04/05/19 1033





I personally evaluated the patient and discussed the management with Dr. Mancia.


I agree with the History, Examination, Assessment and Plan documented above 

with any addition or exceptions noted below.


The patient is doing well this morning.  She remains on comfort feeds.  Will d/

c back to nursing home.  Dr. Montaño has been updated.

## 2019-04-18 ENCOUNTER — HOSPITAL ENCOUNTER (INPATIENT)
Dept: HOSPITAL 92 - ERS | Age: 79
LOS: 4 days | DRG: 682 | End: 2019-04-22
Attending: FAMILY MEDICINE | Admitting: FAMILY MEDICINE
Payer: MEDICARE

## 2019-04-18 VITALS — BODY MASS INDEX: 23.9 KG/M2

## 2019-04-18 DIAGNOSIS — N18.3: ICD-10-CM

## 2019-04-18 DIAGNOSIS — Z66: ICD-10-CM

## 2019-04-18 DIAGNOSIS — E86.0: ICD-10-CM

## 2019-04-18 DIAGNOSIS — F31.9: ICD-10-CM

## 2019-04-18 DIAGNOSIS — I12.9: ICD-10-CM

## 2019-04-18 DIAGNOSIS — E78.5: ICD-10-CM

## 2019-04-18 DIAGNOSIS — G20: ICD-10-CM

## 2019-04-18 DIAGNOSIS — E87.5: ICD-10-CM

## 2019-04-18 DIAGNOSIS — E87.1: ICD-10-CM

## 2019-04-18 DIAGNOSIS — R79.1: ICD-10-CM

## 2019-04-18 DIAGNOSIS — D64.9: ICD-10-CM

## 2019-04-18 DIAGNOSIS — K21.9: ICD-10-CM

## 2019-04-18 DIAGNOSIS — K72.01: ICD-10-CM

## 2019-04-18 DIAGNOSIS — E87.2: ICD-10-CM

## 2019-04-18 DIAGNOSIS — Z96.643: ICD-10-CM

## 2019-04-18 DIAGNOSIS — R74.0: ICD-10-CM

## 2019-04-18 DIAGNOSIS — Z95.0: ICD-10-CM

## 2019-04-18 DIAGNOSIS — G93.41: ICD-10-CM

## 2019-04-18 DIAGNOSIS — M81.0: ICD-10-CM

## 2019-04-18 DIAGNOSIS — I95.9: ICD-10-CM

## 2019-04-18 DIAGNOSIS — I24.8: ICD-10-CM

## 2019-04-18 DIAGNOSIS — Z79.01: ICD-10-CM

## 2019-04-18 DIAGNOSIS — I48.91: ICD-10-CM

## 2019-04-18 DIAGNOSIS — N17.0: Primary | ICD-10-CM

## 2019-04-18 DIAGNOSIS — Z51.5: ICD-10-CM

## 2019-04-18 DIAGNOSIS — I48.1: ICD-10-CM

## 2019-04-18 DIAGNOSIS — E11.22: ICD-10-CM

## 2019-04-18 DIAGNOSIS — F02.80: ICD-10-CM

## 2019-04-18 DIAGNOSIS — R00.1: ICD-10-CM

## 2019-04-18 LAB
ALBUMIN SERPL BCG-MCNC: 2.9 G/DL (ref 3.4–4.8)
ALP SERPL-CCNC: 246 U/L (ref 40–150)
ALT SERPL W P-5'-P-CCNC: 660 U/L (ref 8–55)
ANALYZER IN CARDIO: (no result)
ANION GAP SERPL CALC-SCNC: 22 MMOL/L (ref 10–20)
APAP SERPL-MCNC: (no result) MCG/ML (ref 10–30)
AST SERPL-CCNC: 3249 U/L (ref 5–34)
BASE EXCESS STD BLDA CALC-SCNC: -6.4 MEQ/L
BASOPHILS # BLD AUTO: 0 THOU/UL (ref 0–0.2)
BASOPHILS NFR BLD AUTO: 0 % (ref 0–1)
BILIRUB SERPL-MCNC: 0.7 MG/DL (ref 0.2–1.2)
BUN SERPL-MCNC: 47 MG/DL (ref 9.8–20.1)
CA-I BLDA-SCNC: 1.11 MMOL/L (ref 1.12–1.3)
CALCIUM SERPL-MCNC: 8.4 MG/DL (ref 7.8–10.44)
CHLORIDE SERPL-SCNC: 100 MMOL/L (ref 98–107)
CK MB SERPL-MCNC: 1.6 NG/ML (ref 0–6.6)
CO2 SERPL-SCNC: 16 MMOL/L (ref 23–31)
CREAT CL PREDICTED SERPL C-G-VRATE: 0 ML/MIN (ref 70–130)
EOSINOPHIL # BLD AUTO: 0 THOU/UL (ref 0–0.7)
EOSINOPHIL NFR BLD AUTO: 0.2 % (ref 0–10)
GLOBULIN SER CALC-MCNC: 3.1 G/DL (ref 2.4–3.5)
GLUCOSE SERPL-MCNC: 204 MG/DL (ref 83–110)
HCO3 BLDA-SCNC: 17.2 MEQ/L (ref 22–28)
HCT VFR BLDA CALC: 27 % (ref 36–47)
HGB BLD-MCNC: 9.2 G/DL (ref 12–16)
HGB BLDA-MCNC: 9.3 G/DL (ref 12–16)
LYMPHOCYTES # BLD: 0.3 THOU/UL (ref 1.2–3.4)
LYMPHOCYTES NFR BLD AUTO: 3.7 % (ref 21–51)
MCH RBC QN AUTO: 29.3 PG (ref 27–31)
MCV RBC AUTO: 95.3 FL (ref 78–98)
MONOCYTES # BLD AUTO: 0.4 THOU/UL (ref 0.11–0.59)
MONOCYTES NFR BLD AUTO: 4.9 % (ref 0–10)
NEUTROPHILS # BLD AUTO: 8.2 THOU/UL (ref 1.4–6.5)
NEUTROPHILS NFR BLD AUTO: 91.2 % (ref 42–75)
O2 A-A PPRESDIFF RESPIRATORY: 69.81 MM[HG] (ref 0–20)
PCO2 BLDA: 27.9 MMHG (ref 35–45)
PH BLDA: 7.41 [PH] (ref 7.35–7.45)
PLATELET # BLD AUTO: 292 THOU/UL (ref 130–400)
PO2 BLDA: 152 MMHG (ref 60–?)
POTASSIUM BLD-SCNC: 5.67 MMOL/L (ref 3.7–5.3)
POTASSIUM SERPL-SCNC: 6.1 MMOL/L (ref 3.5–5.1)
RBC # BLD AUTO: 3.14 MILL/UL (ref 4.2–5.4)
SALICYLATES SERPL-MCNC: (no result) MG/DL (ref 15–30)
SODIUM SERPL-SCNC: 132 MMOL/L (ref 136–145)
SPECIMEN DRAWN FROM PATIENT: (no result)
WBC # BLD AUTO: 8.9 THOU/UL (ref 4.8–10.8)

## 2019-04-18 PROCEDURE — 51701 INSERT BLADDER CATHETER: CPT

## 2019-04-18 PROCEDURE — 83605 ASSAY OF LACTIC ACID: CPT

## 2019-04-18 PROCEDURE — 36415 COLL VENOUS BLD VENIPUNCTURE: CPT

## 2019-04-18 PROCEDURE — 85027 COMPLETE CBC AUTOMATED: CPT

## 2019-04-18 PROCEDURE — 82140 ASSAY OF AMMONIA: CPT

## 2019-04-18 PROCEDURE — 93005 ELECTROCARDIOGRAM TRACING: CPT

## 2019-04-18 PROCEDURE — 82947 ASSAY GLUCOSE BLOOD QUANT: CPT

## 2019-04-18 PROCEDURE — 85025 COMPLETE CBC W/AUTO DIFF WBC: CPT

## 2019-04-18 PROCEDURE — 82553 CREATINE MB FRACTION: CPT

## 2019-04-18 PROCEDURE — 80053 COMPREHEN METABOLIC PANEL: CPT

## 2019-04-18 PROCEDURE — 82805 BLOOD GASES W/O2 SATURATION: CPT

## 2019-04-18 PROCEDURE — 84300 ASSAY OF URINE SODIUM: CPT

## 2019-04-18 PROCEDURE — 96374 THER/PROPH/DIAG INJ IV PUSH: CPT

## 2019-04-18 PROCEDURE — 83880 ASSAY OF NATRIURETIC PEPTIDE: CPT

## 2019-04-18 PROCEDURE — 83735 ASSAY OF MAGNESIUM: CPT

## 2019-04-18 PROCEDURE — 84145 PROCALCITONIN (PCT): CPT

## 2019-04-18 PROCEDURE — 84443 ASSAY THYROID STIM HORMONE: CPT

## 2019-04-18 PROCEDURE — 83935 ASSAY OF URINE OSMOLALITY: CPT

## 2019-04-18 PROCEDURE — 80074 ACUTE HEPATITIS PANEL: CPT

## 2019-04-18 PROCEDURE — 76705 ECHO EXAM OF ABDOMEN: CPT

## 2019-04-18 PROCEDURE — 82570 ASSAY OF URINE CREATININE: CPT

## 2019-04-18 PROCEDURE — 85610 PROTHROMBIN TIME: CPT

## 2019-04-18 PROCEDURE — P9047 ALBUMIN (HUMAN), 25%, 50ML: HCPCS

## 2019-04-18 PROCEDURE — 81001 URINALYSIS AUTO W/SCOPE: CPT

## 2019-04-18 PROCEDURE — 83930 ASSAY OF BLOOD OSMOLALITY: CPT

## 2019-04-18 PROCEDURE — 71045 X-RAY EXAM CHEST 1 VIEW: CPT

## 2019-04-18 PROCEDURE — 87086 URINE CULTURE/COLONY COUNT: CPT

## 2019-04-18 PROCEDURE — 36416 COLLJ CAPILLARY BLOOD SPEC: CPT

## 2019-04-18 PROCEDURE — A4353 INTERMITTENT URINARY CATH: HCPCS

## 2019-04-18 PROCEDURE — 85007 BL SMEAR W/DIFF WBC COUNT: CPT

## 2019-04-18 PROCEDURE — 84484 ASSAY OF TROPONIN QUANT: CPT

## 2019-04-18 PROCEDURE — 87040 BLOOD CULTURE FOR BACTERIA: CPT

## 2019-04-18 PROCEDURE — 93010 ELECTROCARDIOGRAM REPORT: CPT

## 2019-04-18 PROCEDURE — 80307 DRUG TEST PRSMV CHEM ANLYZR: CPT

## 2019-04-18 PROCEDURE — 96361 HYDRATE IV INFUSION ADD-ON: CPT

## 2019-04-18 PROCEDURE — 84540 ASSAY OF URINE/UREA-N: CPT

## 2019-04-18 PROCEDURE — 85730 THROMBOPLASTIN TIME PARTIAL: CPT

## 2019-04-18 NOTE — RAD
EXAM:

Chest one view:



HISTORY:

Shortness of breath



COMPARISON:

4/3/2019



FINDINGS:

Left ICD. Monitor leads overlie the chest.

Heart size: Moderately enlarged.

The lungs: Bilateral vascular congestion with interstitial and alveolar parenchymal changes in the pe
rihilar regions and lower lung zones.

Bilateral pleural effusions larger on the right side.



IMPRESSION:

Cardiomegaly with bilateral vascular congestion and pleural effusions showing slight improvement from
 prior study.





Reported By: Vu Schmidt 

Electronically Signed:  4/18/2019 8:54 PM

## 2019-04-18 NOTE — PDOC.FPRHP
- History of Present Illness


Chief Complaint: SOB


History of Present Illness: 





80 yo [place]  female brought in by EMS for SOB. Per report, she became SOB 

at NH and required non-rebreather to maintain O2 saturation. By the time of she 

reached the ED she was on 3L of NC at baseline requiring 2L. Rest of HPI is 

unable to be elicited due to mental status. 








In ED Blood pressures 90/50s, was given 1L of fluids. Hyperkalemia of 6.1. 





- Allergies/Adverse Reactions


 Allergies











Allergy/AdvReac Type Severity Reaction Status Date / Time


 


lactose Allergy   Unverified 04/02/19 21:29


 


quetiapine [From Seroquel] Allergy   Unverified 04/02/19 21:29














- Home Medications


 











 Medication  Instructions  Recorded  Confirmed  Type


 


Aripiprazole [Abilify] 50 mg PO HS 06/28/13 04/03/19 History


 


Calcium 600 + Vit D Tablet 1 tab PO DAILY 06/28/13 04/03/19 History


 


Aluminum & Magnesium Hydroxide 30 ml PO Q4HR PRN 03/20/18 04/03/19 History





[Maalox]    


 


Acetaminophen [Tylenol Regular 650 mg PO Q6H PRN  tab 03/23/18 04/03/19 Rx





Strength]    


 


Magnesium Hydroxide [Milk of 400 mg PO BID PRN #0 03/23/18 04/03/19 Rx





Magnesia]    


 


Loperamide HCl [Loperamide] 2 mg PO ASDIR PRN 10/01/18 04/03/19 History


 


guaiFENesin/Dextromethorphan 5 ml PO Q6H PRN 10/01/18 04/03/19 History





[Guaifenesin Dm Syrup]    


 


Alendronate Sodium [Fosamax] 70 mg PO Q7D #4 tab 10/06/18 04/03/19 Rx


 


Amantadine HCl [Amantadine] 100 mg PO BID #60 capsule 10/06/18 04/03/19 Rx


 


Amiodarone [Cordarone] 200 mg PO BID 21 Days #42 tab 10/06/18 04/03/19 Rx


 


Atorvastatin Calcium [Lipitor] 40 mg PO HS #30 tab 10/06/18 04/03/19 Rx


 


Dicyclomine [Bentyl] 20 mg PO TID #90 tab 10/06/18 04/03/19 Rx


 


Meclizine HCl [Antivert] 25 mg PO TID #90 tab 10/06/18 04/03/19 Rx


 


Oxybutynin Chloride 5 mg PO HS #30 tablet 10/06/18 04/03/19 Rx


 


Potassium Chloride [K-Dur] 10 meq PO QAM-WM #15 tab 10/06/18 04/03/19 Rx


 


Sertraline HCl [Zoloft] 200 mg PO DAILY #60 tab 10/06/18 04/03/19 Rx


 


busPIRone HCl [Buspirone HCl] 10 mg PO BID #60 tablet 10/06/18 04/03/19 Rx


 


metFORMIN [Glucophage] 500 mg PO QAM-WM #30 tab 10/06/18 04/03/19 Rx


 


Amoxicillin/Potassium Clav 875 mg PO Q12HR 7 Days #14 tab 04/05/19  Rx





[Augmentin]    


 


Metoprolol Tartrate [Lopressor] 12.5 mg PO BID 30 Days #60 tab 04/05/19  Rx














- History


PMHx: Dementia, Parkinsons, Bipolar, Afib s/p pacemaker, DM2, GERD, HLD, HTN, 

Osteoporosis


 


PSHx: B/l hip replacement





FHx: Mother -MI


 


Social: denies t/e/d








 








- Review of Systems


ROS unobtainable: due to mental status





- Vital signs


BP: 








BP: 95/54, Pulse: 62, Resp: 20, Pain: utr, O2 sat: 100 on Room Air, Time: 4/18/ 2019 22:00.

















- Physical Exam


-Constitutional: 





awake not oriented





GCS 12: E4, V2, M6


HEENT: normocephalic and atraumatic, PERRLA, EOMI


-HEENT: 





dry mucosal membranes 


Neck: trachea midline


-Chest: 





pacemaker 


Heart: RRR


Lungs: other (dec air movement)


-Lungs: 





crackles at lung base 


Abdomen: soft, bowel sounds present


Neurological: no focal deficit


-Heme/Lymphatic: 





purpura on hands 





FMR H&P: Results





- Labs


Result Diagrams: 


 04/18/19 20:52





 04/18/19 20:52


Lab results: 


 











WBC  8.9 thou/uL (4.8-10.8)   04/18/19  20:52    


 


Hgb  9.2 g/dL (12.0-16.0)  L  04/18/19  20:52    


 


Hct  29.9 % (36.0-47.0)  L  04/18/19  20:52    


 


MCV  95.3 fL (78.0-98.0)   04/18/19  20:52    


 


Plt Count  292 thou/uL (130-400)   04/18/19  20:52    


 


Neutrophils %  91.2 % (42.0-75.0)  H  04/18/19  20:52    


 


ABG pH  7.41  (7.35-7.45)   04/18/19  21:20    


 


ABG pCO2  27.9 mmHg (35.0-45.0)  L  04/18/19  21:20    


 


ABG pO2  152.0 mmHg (> 60.0)  H  04/18/19  21:20    


 


Sodium  132 mmol/L (136-145)  L  04/18/19  20:52    


 


Potassium  6.1 mmol/L (3.5-5.1)  H  04/18/19  20:52    


 


Chloride  100 mmol/L ()   04/18/19  20:52    


 


Carbon Dioxide  16 mmol/L (23-31)  L  04/18/19  20:52    


 


BUN  47 mg/dL (9.8-20.1)  H  04/18/19  20:52    


 


Creatinine  2.49 mg/dL (0.6-1.1)  H  04/18/19  20:52    


 


Glucose  204 mg/dL ()  H  04/18/19  20:52    


 


Calcium  8.4 mg/dL (7.8-10.44)   04/18/19  20:52    


 


Total Bilirubin  0.7 mg/dL (0.2-1.2)   04/18/19  20:52    


 


AST  3249 U/L (5-34)  H  04/18/19  20:52    


 


ALT  660 U/L (8-55)  H  04/18/19  20:52    


 


Alkaline Phosphatase  246 U/L ()  H  04/18/19  20:52    


 


CK-MB (CK-2)  1.6 ng/mL (0-6.6)   04/18/19  20:52    


 


B-Natriuretic Peptide  352.4 pg/mL (0-100)  H  04/18/19  20:48    


 


Serum Total Protein  6.0 g/dL (6.0-8.3)   04/18/19  20:52    


 


Albumin  2.9 g/dL (3.4-4.8)  L  04/18/19  20:52    














- EKG Interpretation


EKG: 





paced, no ST changes or peaked T waves 





- Radiology Interpretation


  ** Chest x-ray


Status: image reviewed by me, report reviewed by me


Additional comment: 





pulmonary effusions





FMR H&P: A/P





- Problem List


(1) Encephalopathy acute


Current Visit: Yes   Status: Acute   Code(s): G93.40 - ENCEPHALOPATHY, 

UNSPECIFIED   





(2) Acute kidney injury superimposed on CKD


Current Visit: Yes   Status: Acute   Code(s): N17.9 - ACUTE KIDNEY FAILURE, 

UNSPECIFIED; N18.9 - CHRONIC KIDNEY DISEASE, UNSPECIFIED   





(3) Hyperkalemia


Current Visit: Yes   Status: Acute   Code(s): E87.5 - HYPERKALEMIA   





(4) Metabolic acidosis


Current Visit: Yes   Status: Acute   Code(s): E87.2 - ACIDOSIS   





(5) Hyponatremia


Current Visit: No   Status: Acute   Code(s): E87.1 - HYPO-OSMOLALITY AND 

HYPONATREMIA   





(6) Transaminitis


Current Visit: No   Status: Acute   Code(s): R74.0 - NONSPEC ELEV OF LEVELS OF 

TRANSAMNS & LACTIC ACID DEHYDRGNSE   





(7) Supratherapeutic INR


Current Visit: Yes   Status: Acute   Code(s): R79.1 - ABNORMAL COAGULATION 

PROFILE   





(8) HTN (hypertension)


Current Visit: No   Status: Chronic   Code(s): I10 - ESSENTIAL (PRIMARY) 

HYPERTENSION   





(9) Osteoporosis


Current Visit: No   Status: Chronic   Code(s): M81.0 - AGE-RELATED OSTEOPOROSIS 

W/O CURRENT PATHOLOGICAL FRACTURE   





(10) Diabetes type 2, controlled


Current Visit: No   Status: Chronic   Code(s): E11.9 - TYPE 2 DIABETES MELLITUS 

WITHOUT COMPLICATIONS   





(11) Atrial fibrillation


Current Visit: No   Status: Chronic   Code(s): I48.91 - UNSPECIFIED ATRIAL 

FIBRILLATION   


Qualifiers: 


   Atrial fibrillation type: persistent   Qualified Code(s): I48.1 - Persistent 

atrial fibrillation   





- Plan











#Acute metabolic encephalopathy


-ddx: 2/2 infection vs. dehydration vs. metabolic derangement


-No white count, however recently hospitalized for aspiration PNA. Concern pt 

have been aspirating since last hospitalization. Per daughter in law she was 

originally scheduled for PEG tube placement this upcoming Monday. 


-Strict NPO due to aspiration risk-can consider reconsulting speech but was 

recently assessed 2 weeks ago. 


-CXR showing pulmonary edema, however pt non-hypoxic on baselineO2, ABG unrmk


-Pending UA/UCx/Blood Cx/Procal


-Will obtain TSH, ammonia


-Start zosyn


-Admit to tele/inpt 





#Hyperkalemia 


-No EKG changes


-Will give kayex NH, and calcium chloride 


-Taking PO potassium at home, will hold 


-Monitor in tele, repeat on CMP in AM





#Mild Dehydration


-Low BPs in 90s/50s


-s/p 1 L bolus, will bolus 500cc, then start mIVF at low dose. 


-Obtain lactic acid 





#AGap metabolic acidosis


-Agap 16, likely from dehydration


-Will gently hydrate


-Obtain lactic acid


-Repeat in AM





#Transaminitis


-Likely 2/2 to dehydration


-See above for fluid resuscitation plan


-Repeat CMP in AM


-Consider hepatitis serology/RUQ US if no improvemnt





#NIKHIL on CKD


-Creat 2.4, baseline <1


-Likely 2/2 volume depletion


-Not quite prerenal pattern so will obtain urine osm/urea 





#Indeterminate trops


-EKG with no signs of acute ischemia


-Likely demand ischemia with impaired renal clearnce


-Trend





#Supratherapeutic INR


-On eliquis for afib 


-Hold home eliquis


-Daily coag panel





#DM2


-sliding scale





#HTN


-hold antihypertensives due to low BPs





#Osteoporosis 


-MD aware 








code: DNAR


dvt ppx: SCD








Discussed w/ Dr. Blake




















FMR H&P: Upper Level





- Pertinent history





80 yo CF with PMH of dementia, CVA, afib with recent pacemaker placement and 

recent hospitalization for aspiration PNA presenting from [facility] due to 

concerns of SOB. Reportedly, NH staff noted SOB and stated pt required 

nonrebreather to keep oxygen saturations. Pt also reportedly had poor PO intake 

recently. Pt's family/MPOA state that since her last hospitalization, pt has 

decided she would like PEG tube placement. She was evaluated by speech therapy 

during last hospitalization and found to not be safe for any type of PO intake. 

Pt reportedly oriented at baseline but currently not able to converse.





- Pertinent findings





BP 89/43, HR 64, RR 20, O2 100% on 2L NC, afebrile


Gen: appears in NAD


HEENT: dry oropharynx


CV: RRR, paced


Resp: shallow inspiratory efforts, CTAB except poor bibasilar breath sounds








- Plan


Date/Time: 04/18/19 6626








I, Murray Carrillo MD PGY3, have evaluated this patient and agree with findings/

plan as outlined by intern resident. Pertinent changes/additions are listed 

here.





1. Acute Metabolic Encephalopthy possibly secondary to moderate hypovolemia


-Pt clinically appears hypovolemic with dry mucus membranes and recent history 

of poor PO intake. CXR appears somewhat fluid overloaded with vascular 

congestion and blunting of costophrenic angles. With pt's recent history of 

aspiration PNA, could be contributing to CXR appearance. As pt has likely 

continued to aspirate in NH, will place pt on abx coverage with Zosyn.


-At baseline, pt reportedly oriented, however, recently failed a swallow 

evaluation and was deemed unsafe for an oral diet. Recommendation at last 

hospitalization was PEG vs comfort feeds. Family reports that pt has chosen to 

pursue PEG tube placement.


-Will obtain UA, osmolality studies, ammonia, serum drug screen, lactic acid, 

and procalcitonin to better discern source of encephalopathy.


-Continue gentle IVF with close monitory of respiratory status.


-GI and palliative care consults in AM to discuss possible PEG placement. 

Discussion with family should focus on goals of care and pt's previously voiced 

wishes against PEG placement.





2. Anion gap metabolic acidosis


-Obtain lactic acid and continue per above.





3. NIKHIL on CKD3


-IVF and trend.


-Urine studies ordered.





4. Elevated LFTs likely 2/2 hepatic congestion vs hypoperfusion


-Recent TTE in 10/2018 showed normal EF, however, CXR appears fluid overloaded 

and LFTs support diagnosis of hepatic congestion.


-Obtain ammonia.





5. Hyperkalemia


-No EKG changes. Give calcium gluconate and NH kayexalate. Repeat BMP in AM.





6. Indeterminate troponin likely 2/2 hypovolemia and demand ischemia


-Trend cardiac enzymes.





CODE: DNAR


PPx: Eliquis for VTE, Protonix for GI.





disposition: Admit to inpatient telemetry for anticipated length of stay 

greater than two midnights, pending clinical course.





Addendum - Attending





- Attending Attestation


Date/Time: 04/19/19 7442





I personally evaluated the patient and discussed the management with Raymundo Rodríguez/

Gerardo


I agree with the History, Examination, Assessment and Plan documented above 

with any addition or exceptions noted below.





80 yo female sent to ER from [facility] with AMS and dyspnea. Patient recently 

admitted with aspiration PNA and was evaluated by Palliative Care and initially 

declined Peg tube however recently patient and family decided to pursue PEG 

tube placement for aspiration and choking. Patient is a DNR at this time


Patient appears markedly dehydrated alert oriented to person only during my 

exam. hypotensive Lung crackles at bases noted.heart regular 100% paced,abdomen 

slight distended no rebound or guarding





PMHX:from record review


recent admission with Aspiration PNA, embolic CVA lacunar and frontal infarcts,

Paroxysmal Atrial fibrillation, SSS s/p pacemaker, Parkisonism, long standing 

mental illness, GERD large hiatal hernia, HTN, DM2, dyslipidemia,osteoporosis, 

colon polyps


Recent TT Echocardiogram With EF 60-65%





Surgery:


pacemaker 10/28


Cholecystectomy


L hip surgery ?THR, R Hip ORIF fracture, right femoral shaft fx








Patient with AMS presumed toxic encephalopathy, metabolic acidosis,recurrent 

aspiration PNA, hyperkalemia, dehydration NIKHIL,elevated LFTs, HX atrial fib, 

indeterminate troponin,  poor tissue perfusion and multiple organ injury, long 

term prognosis guarded re-engage palliative care, fluid resuscitation watch 

fluid overload , empirical AB to cover asp/PNA r/o sepsis not meeting SIRS 

criteria at admission,trend LFT,RFT further evaluation and f/u regard PEG tube 

placement.

## 2019-04-19 LAB
ALBUMIN SERPL BCG-MCNC: 2.5 G/DL (ref 3.4–4.8)
ALBUMIN SERPL BCG-MCNC: 2.7 G/DL (ref 3.4–4.8)
ALP SERPL-CCNC: 390 U/L (ref 40–150)
ALP SERPL-CCNC: 470 U/L (ref 40–150)
ALT SERPL W P-5'-P-CCNC: 1894 U/L (ref 8–55)
ALT SERPL W P-5'-P-CCNC: 1906 U/L (ref 8–55)
ANION GAP SERPL CALC-SCNC: 18 MMOL/L (ref 10–20)
ANION GAP SERPL CALC-SCNC: 24 MMOL/L (ref 10–20)
APTT PPP: 49.5 SEC (ref 22.9–36.1)
APTT PPP: 50.1 SEC (ref 22.9–36.1)
AST SERPL-CCNC: (no result) U/L (ref 5–34)
AST SERPL-CCNC: (no result) U/L (ref 5–34)
BACTERIA UR QL AUTO: (no result) HPF
BILIRUB SERPL-MCNC: 0.7 MG/DL (ref 0.2–1.2)
BILIRUB SERPL-MCNC: 1.1 MG/DL (ref 0.2–1.2)
BUN SERPL-MCNC: 50 MG/DL (ref 9.8–20.1)
BUN SERPL-MCNC: 51 MG/DL (ref 9.8–20.1)
BURR CELLS BLD QL SMEAR: (no result) (100X)
CALCIUM SERPL-MCNC: 8.8 MG/DL (ref 7.8–10.44)
CALCIUM SERPL-MCNC: 8.8 MG/DL (ref 7.8–10.44)
CHLORIDE SERPL-SCNC: 103 MMOL/L (ref 98–107)
CHLORIDE SERPL-SCNC: 106 MMOL/L (ref 98–107)
CK MB SERPL-MCNC: 1.9 NG/ML (ref 0–6.6)
CK MB SERPL-MCNC: 2.4 NG/ML (ref 0–6.6)
CO2 SERPL-SCNC: 10 MMOL/L (ref 23–31)
CO2 SERPL-SCNC: 18 MMOL/L (ref 23–31)
CREAT CL PREDICTED SERPL C-G-VRATE: 16 ML/MIN (ref 70–130)
CREAT CL PREDICTED SERPL C-G-VRATE: 17 ML/MIN (ref 70–130)
CRYSTAL-AUWI FLAG: 2.4 (ref 0–15)
GLOBULIN SER CALC-MCNC: 3.2 G/DL (ref 2.4–3.5)
GLOBULIN SER CALC-MCNC: 3.6 G/DL (ref 2.4–3.5)
GLUCOSE SERPL-MCNC: 144 MG/DL (ref 83–110)
GLUCOSE SERPL-MCNC: 91 MG/DL (ref 83–110)
HBCM INDEX: 0.09 S/CO (ref 0–0.79)
HBSAG INDEX: 0.88 S/CO (ref 0–0.99)
HEP A IGM S/CO: 0.18 S/CO (ref 0–0.79)
HEP C INDEX: 0.09 S/CO (ref 0–0.79)
HEV IGM SER QL: 4.2 (ref 0–7.99)
HGB BLD-MCNC: 7.9 G/DL (ref 12–16)
HGB BLD-MCNC: 9.2 G/DL (ref 12–16)
HYALINE CASTS #/AREA URNS LPF: (no result) LPF
INR PPP: 3.4
INR PPP: 3.4
MCH RBC QN AUTO: 29.4 PG (ref 27–31)
MCH RBC QN AUTO: 29.4 PG (ref 27–31)
MCV RBC AUTO: 93.7 FL (ref 78–98)
MCV RBC AUTO: 96.5 FL (ref 78–98)
MDIFF COMPLETE?: YES
MDIFF COMPLETE?: YES
MICROCYTES BLD QL SMEAR: (no result) (100X)
OVALOCYTES BLD QL SMEAR: (no result) (100X)
PATHC CAST-AUWI FLAG: 6.78 (ref 0–2.49)
PLATELET # BLD AUTO: 200 THOU/UL (ref 130–400)
PLATELET # BLD AUTO: 274 THOU/UL (ref 130–400)
POLYCHROMASIA BLD QL SMEAR: (no result) (100X)
POLYCHROMASIA BLD QL SMEAR: (no result) (100X)
POTASSIUM SERPL-SCNC: 5.5 MMOL/L (ref 3.5–5.1)
POTASSIUM SERPL-SCNC: 6.2 MMOL/L (ref 3.5–5.1)
PROT UR STRIP.AUTO-MCNC: 300 MG/DL
PROTHROMBIN TIME: 34.4 SEC (ref 12–14.7)
PROTHROMBIN TIME: 34.6 SEC (ref 12–14.7)
RBC # BLD AUTO: 2.7 MILL/UL (ref 4.2–5.4)
RBC # BLD AUTO: 3.12 MILL/UL (ref 4.2–5.4)
RBC UR QL AUTO: (no result) HPF (ref 0–3)
SODIUM SERPL-SCNC: 133 MMOL/L (ref 136–145)
SODIUM SERPL-SCNC: 134 MMOL/L (ref 136–145)
SODIUM UR-SCNC: 30 MMOL/L
SP GR UR STRIP: 1.02 (ref 1–1.04)
SPERM-AUWI FLAG: 0 (ref 0–9.9)
TROPONIN I SERPL DL<=0.01 NG/ML-MCNC: 0.05 NG/ML (ref ?–0.03)
TROPONIN I SERPL DL<=0.01 NG/ML-MCNC: 0.06 NG/ML (ref ?–0.03)
WBC # BLD AUTO: 4.9 THOU/UL (ref 4.8–10.8)
WBC # BLD AUTO: 6.2 THOU/UL (ref 4.8–10.8)
WBC UR QL AUTO: (no result) HPF (ref 0–3)
YEAST-AUWI FLAG: 30.5 (ref 0–25)

## 2019-04-19 RX ADMIN — PIPERACILLIN SODIUM, TAZOBACTAM SODIUM SCH MLS: 2; .25 INJECTION, POWDER, LYOPHILIZED, FOR SOLUTION INTRAVENOUS at 09:13

## 2019-04-19 RX ADMIN — Medication SCH ML: at 09:39

## 2019-04-19 RX ADMIN — PIPERACILLIN SODIUM, TAZOBACTAM SODIUM SCH MLS: 2; .25 INJECTION, POWDER, LYOPHILIZED, FOR SOLUTION INTRAVENOUS at 14:35

## 2019-04-19 RX ADMIN — PIPERACILLIN SODIUM, TAZOBACTAM SODIUM SCH MLS: 2; .25 INJECTION, POWDER, LYOPHILIZED, FOR SOLUTION INTRAVENOUS at 20:55

## 2019-04-19 RX ADMIN — PIPERACILLIN SODIUM, TAZOBACTAM SODIUM SCH MLS: 2; .25 INJECTION, POWDER, LYOPHILIZED, FOR SOLUTION INTRAVENOUS at 02:06

## 2019-04-19 RX ADMIN — Medication SCH: at 20:55

## 2019-04-19 NOTE — ULT
Gallbladder ultrasound:



Multiple grayscale images of right upper quadrant obtained according to protocol.



INDICATION: Pain



Reference is made to 4/3/2019 exam





FINDINGS:

Liver: Mild coarsening of echotexture, with slight nodularity of the hepatic contour

Gallbladder: Not visualized

Common bile duct is normal.

Ascites: None



IMPRESSION:

Nonvisualization of gallbladder.



Slight nodularity suggested that the visualized hepatic contour. This could relate to cirrhosis. Edgard weaver clinically.



Reported By: Keshav Tobin 

Electronically Signed:  4/19/2019 9:50 AM

## 2019-04-19 NOTE — PDOC.EVN
Event Note





- Event Note


Event Note: 





Residents called to bedside at approx 12:45. Patient had removed IV and IVF had 

fluid on gown. Patient was partially laying with legs off bed. HR 47. No other 

VS able to be obtained. Patient not responsive to questions, agonal breathing. 

I stepped outside to speak with brother, JENNIFER, who said he confirmed DNAR status

, but would like other supportive measures to be carried out. During 

conversation, Aleksandar So is called. Cardiac monitoring is placed for a monitor 

in the room which showed HR in the low 50s with a junctional rhythm which 

turned out to be ventricular pacing from AICD, confirmed with EKG. Manual BP 70/

40. Atropine was given with improvement of HR to low 60s. Slowly lowered to low 

50s, atropine was given once more with HR improving to upper 50s to low 60s. BP 

90/50s. 200ml fluid bolus given with patient's mental status improving, able to 

make eye contact, but still not answering questions. Pulse Ox on forehead 

showed 100% while O2 was 4L. Rectal temp was attempted but not obtained.





Spoke again with JENNIFER who confirmed that his goal was to get to Monday when she 

could have PEG placed. Discussed with JENNIFER that patient's liver and kidney show 

decline and that the prognosis is poor. He emphasized that patient is DNAR, but 

he would like other measures including escalating care such as CCU if required 

to allow patient to get to Monday for PEG placement. Palliative care has been 

consulted this morning to discuss goals of care with family. [facility] 

that someone from palliative would be by to discuss with them. 





Addendum - Attending





- Attending Attestation


Patient seen and examined with resident on 4/19/2019. Labs reviewed. Findings 

discussed with brother who is the patient's MPOA. Discussed that she appears to 

be in multi-organ failure that is not responding to conservative measures with 

fluid bolus and medications. Discussed that condition is most likely terminal 

unless he wishes more aggressive treatment. Palliative consult had also been 

obtained and had spoken with him. He states that he wished for comfort measures 

and not to escalate care. Will continue with current medications and add 

lorazepam for terminal restlessness and morphine for pain.

## 2019-04-19 NOTE — PDOC.FM
- Subjective


Subjective: 





Catrina Álvarez seen at bedside this morning.  Nurse called and stated that patient 

was agitated this morning.  Patient is requesting something to calm her down, 

she is A&O X2.  She denies any pain, dyspnea, chest pain, fever, chills, n/v. 





- Objective


MAR Reviewed: Yes


Vital Signs & Weight: 


 Vital Signs (12 hours)











  Temp Pulse Resp BP Pulse Ox


 


 04/19/19 03:58  96.8 F L  65  24 H  113/55 L  93 L


 


 04/19/19 00:55      100


 


 04/19/19 00:50  97.9 F  76  21 H  145/61 H  100








 Weight











Weight                         61.326 kg














Result Diagrams: 


 04/19/19 13:38





 04/19/19 13:38





Phys Exam





- Physical Examination


appears anxious/restless


HEENT: moist MMs, sclera anicteric


Neck: supple, full ROM


bilateral rhonchi


Cardiovascular: RRR, no significant murmur


Gastrointestinal: soft, non-tender, no distention


Musculoskeletal: no edema, pulses present


Neurological: non-focal, normal sensation, moves all 4 limbs


Deviation from normal: anxious affect, A&O X2


Skin: no rash





Dx/Plan


(1) Encephalopathy acute


Code(s): G93.40 - ENCEPHALOPATHY, UNSPECIFIED   Status: Acute   





(2) Metabolic acidosis


Code(s): E87.2 - ACIDOSIS   Status: Acute   





(3) Hyperkalemia


Code(s): E87.5 - HYPERKALEMIA   Status: Acute   





(4) Acute kidney injury superimposed on CKD


Code(s): N17.9 - ACUTE KIDNEY FAILURE, UNSPECIFIED; N18.9 - CHRONIC KIDNEY 

DISEASE, UNSPECIFIED   Status: Acute   





(5) Supratherapeutic INR


Code(s): R79.1 - ABNORMAL COAGULATION PROFILE   Status: Acute   





(6) Transaminitis


Code(s): R74.0 - NONSPEC ELEV OF LEVELS OF TRANSAMNS & LACTIC ACID DEHYDRGNSE   

Status: Acute   





(7) Atrial fibrillation


Code(s): I48.91 - UNSPECIFIED ATRIAL FIBRILLATION   Status: Chronic   


Qualifiers: 


   Atrial fibrillation type: persistent   Qualified Code(s): I48.1 - Persistent 

atrial fibrillation   





(8) Diabetes type 2, controlled


Code(s): E11.9 - TYPE 2 DIABETES MELLITUS WITHOUT COMPLICATIONS   Status: 

Chronic   





(9) Dyslipidemia


Code(s): E78.5 - HYPERLIPIDEMIA, UNSPECIFIED   Status: Chronic   





(10) HTN (hypertension)


Code(s): I10 - ESSENTIAL (PRIMARY) HYPERTENSION   Status: Chronic   





(11) Osteoporosis


Code(s): M81.0 - AGE-RELATED OSTEOPOROSIS W/O CURRENT PATHOLOGICAL FRACTURE   

Status: Chronic   





- Plan


Plan: 





Acute metabolic encephalopathy:


-  ddx: 2/2 infection vs. dehydration vs. metabolic derangement


-  No white count, however recently hospitalized for aspiration PNA. Concern pt 

have been aspirating since last hospitalization. Per daughter in law she was 

originally scheduled for PEG tube placement this upcoming Monday. 


-  Strict NPO due to aspiration risk-can consider reconsulting speech but was 

recently assessed 2 weeks ago. 


-  CXR showing pulmonary edema, however pt non-hypoxic on baselineO2, ABG unrmk


-  Blood and urine cx pending


-  TSH and ammonia were normal


-  Procal was 1.0 with hx of high aspiration risk, Continue zosyn





Hyperkalemia:


-  No EKG changes


-  Will give kayex [place], and calcium chloride 


-  Taking PO potassium at home, will hold 


-  Monitor in tele, repeat this morning is 5.5





Mild Dehydration:


-  Low BPs in 90s/50s on admission


-  s/p 1.5 L Bolus, continue mIVFs


-  BP improved this morning





AGap metabolic acidosis


-  Agap 16, likely from dehydration


-  Will gently hydrate


-  lactic acid 2.3 this morning





Transaminitis


-  Likely 2/2 to dehydration


-  See above for fluid resuscitation plan


-  Repeat CMP shows worsening transaminitis


-  Checking hepatitis serology/RUQ US 





NIKHIL on CKD


-  Creat 2.4, baseline <1


-  Likely 2/2 volume depletion


-  Not quite prerenal pattern so will obtain urine osm/urea 





Indeterminate trops


-  EKG with no signs of acute ischemia


-  Likely demand ischemia with impaired renal clearnce


-  Trend





Supratherapeutic INR


-  On eliquis for afib 


-  Daily coag panel





DM2


-  sliding scale





HTN


-  hold antihypertensives due to low BPs





Osteoporosis 











Addendum - Attending





- Attending Attestation


Date/Time: 04/19/19 5594





I personally evaluated the patient and discussed the management with Dr. Go


I agree with the History, Examination, Assessment and Plan documented above 

with any addition or exceptions noted below.





Very poor prognosis. Shock liver with multiple organ system dysfunction. 

Patient DNR and confirms. Will speak to family. GI consulted. Continue fluids. 

Pressor as needed if does not want palliative care. Candidate for hospice/

palliative. 





Latoya

## 2019-04-20 LAB
ALBUMIN SERPL BCG-MCNC: 2.6 G/DL (ref 3.4–4.8)
ALP SERPL-CCNC: 427 U/L (ref 40–150)
ALT SERPL W P-5'-P-CCNC: 1609 U/L (ref 8–55)
ANION GAP SERPL CALC-SCNC: 21 MMOL/L (ref 10–20)
APTT PPP: 53 SEC (ref 22.9–36.1)
AST SERPL-CCNC: 3458 U/L (ref 5–34)
BILIRUB SERPL-MCNC: 1.4 MG/DL (ref 0.2–1.2)
BUN SERPL-MCNC: 58 MG/DL (ref 9.8–20.1)
CALCIUM SERPL-MCNC: 8.1 MG/DL (ref 7.8–10.44)
CHLORIDE SERPL-SCNC: 107 MMOL/L (ref 98–107)
CO2 SERPL-SCNC: 14 MMOL/L (ref 23–31)
CREAT CL PREDICTED SERPL C-G-VRATE: 14 ML/MIN (ref 70–130)
GLOBULIN SER CALC-MCNC: 2.9 G/DL (ref 2.4–3.5)
GLUCOSE SERPL-MCNC: 80 MG/DL (ref 83–110)
HGB BLD-MCNC: 8 G/DL (ref 12–16)
INR PPP: 4.2
MCH RBC QN AUTO: 29.8 PG (ref 27–31)
MCV RBC AUTO: 93.9 FL (ref 78–98)
MDIFF COMPLETE?: YES
PLATELET # BLD AUTO: 180 THOU/UL (ref 130–400)
POTASSIUM SERPL-SCNC: 6.2 MMOL/L (ref 3.5–5.1)
PROTHROMBIN TIME: 40.6 SEC (ref 12–14.7)
RBC # BLD AUTO: 2.68 MILL/UL (ref 4.2–5.4)
SODIUM SERPL-SCNC: 136 MMOL/L (ref 136–145)
WBC # BLD AUTO: 4.3 THOU/UL (ref 4.8–10.8)

## 2019-04-20 RX ADMIN — ALBUMIN HUMAN SCH GM: 250 SOLUTION INTRAVENOUS at 17:19

## 2019-04-20 RX ADMIN — PIPERACILLIN SODIUM, TAZOBACTAM SODIUM SCH MLS: 2; .25 INJECTION, POWDER, LYOPHILIZED, FOR SOLUTION INTRAVENOUS at 15:32

## 2019-04-20 RX ADMIN — Medication SCH: at 08:39

## 2019-04-20 RX ADMIN — PIPERACILLIN SODIUM, TAZOBACTAM SODIUM SCH MLS: 2; .25 INJECTION, POWDER, LYOPHILIZED, FOR SOLUTION INTRAVENOUS at 21:07

## 2019-04-20 RX ADMIN — ALBUMIN HUMAN SCH GM: 250 SOLUTION INTRAVENOUS at 23:21

## 2019-04-20 RX ADMIN — ALBUMIN HUMAN SCH GM: 250 SOLUTION INTRAVENOUS at 12:45

## 2019-04-20 RX ADMIN — PIPERACILLIN SODIUM, TAZOBACTAM SODIUM SCH MLS: 2; .25 INJECTION, POWDER, LYOPHILIZED, FOR SOLUTION INTRAVENOUS at 02:51

## 2019-04-20 RX ADMIN — Medication SCH: at 21:06

## 2019-04-20 RX ADMIN — PIPERACILLIN SODIUM, TAZOBACTAM SODIUM SCH MLS: 2; .25 INJECTION, POWDER, LYOPHILIZED, FOR SOLUTION INTRAVENOUS at 08:54

## 2019-04-20 NOTE — PDOC.FM
- Subjective


Subjective: 





78 yo female seen at bedside this AM. Patient is lying in bed making 

incomprehensible sounds. Patient does not follow commands or respond to 

questions.





Per nursing staff, patient has been like that since yesterday afternoon. Her HR 

and BP has remained WNL. She did receive one dose of ativan overnight due to 

agitation. No other history is able to be obtained. 





- Objective


Vital Signs & Weight: 


 Vital Signs (12 hours)











  Temp Pulse Resp BP Pulse Ox


 


 04/20/19 04:00  99.2 F  85  22 H  126/64  95


 


 04/19/19 23:35   78  23 H  119/66  100


 


 04/19/19 19:40  98.2 F  52 L  20  103/64  84 L








 Weight











Weight                         65.181 kg














I&O: 


 











 04/18/19 04/19/19 04/20/19





 06:59 06:59 06:59


 


Intake Total   3460


 


Balance   3460











Result Diagrams: 


 04/20/19 04:30





 04/20/19 04:30





Phys Exam





- Physical Examination


Constitutional: NAD


HEENT: moist MMs


Respiratory: no wheezing


bilateral rhonchi


Cardiovascular: RRR, no significant murmur


Gastrointestinal: soft, positive bowel sounds


Musculoskeletal: no edema, pulses present


Neurological: moves all 4 limbs


Does not follow commands


Skin: no rash





Dx/Plan


(1) Encephalopathy acute


Code(s): G93.40 - ENCEPHALOPATHY, UNSPECIFIED   Status: Acute   





(2) Shock liver


Code(s): K72.00 - ACUTE AND SUBACUTE HEPATIC FAILURE WITHOUT COMA   Status: 

Acute   





(3) Hyperkalemia


Code(s): E87.5 - HYPERKALEMIA   Status: Acute   





(4) Acute kidney injury superimposed on CKD


Code(s): N17.9 - ACUTE KIDNEY FAILURE, UNSPECIFIED; N18.9 - CHRONIC KIDNEY 

DISEASE, UNSPECIFIED   Status: Acute   





(5) Supratherapeutic INR


Code(s): R79.1 - ABNORMAL COAGULATION PROFILE   Status: Acute   





(6) Diabetes type 2, controlled


Code(s): E11.9 - TYPE 2 DIABETES MELLITUS WITHOUT COMPLICATIONS   Status: 

Chronic   





(7) HTN (hypertension)


Code(s): I10 - ESSENTIAL (PRIMARY) HYPERTENSION   Status: Chronic   





- Plan


Plan: 





Acute metabolic encephalopathy:


-  ddx: 2/2 infection vs. dehydration vs. metabolic derangement


-  Urine culture and blood culture no growth to date 


-  TSH and ammonia were normal


-  Procal was 1.0 with hx of high aspiration risk, Continue zosyn





Hyperkalemia:


-  No EKG changes


-  Will give kayex [place], and calcium chloride 


-  Monitor in tele, repeat this morning is 6.2





Shock Liver:


- Elevated AST/ALT


- Multi system organ failure





Transaminitis


-  Likely 2/2 to previous hypotension


-  Repeat CMP shows sustained elevation


-  RUQ US may show cirrhosis


-  Hepatitis panel negative





NIKHIL on CKD


-  Creat 3.13, baseline <1


-  Likely 2/2 volume depletion


-  Likely due to above 


-  Consider Nephrology consultation due to NIKHIL and Hyperkalemia





Supratherapeutic INR


-  On eliquis for afib 


-  Daily coag panel


- INR this AM 4.2





DM2


-  sliding scale





HTN


-  hold antihypertensives due to low BPs








Disposition: Poor, will continue current plan of care following MPOA wishes.

## 2019-04-20 NOTE — PRG
DATE OF SERVICE:  04/20/2019



SUBJECTIVE:  This is an 79-year-old , hospitalized with hypotension,

dyspnea, and also abnormal LFTs.  The patient's liver profile is suggestive of acute

liver injury.  The patient __________. The patient's hepatitis markers are negative. 



OBJECTIVE:  GENERAL:  She is very sleepy today. 

VITAL SIGNS:  Stable. 

CARDIOVASCULAR SYSTEM:  Within normal limits. 

ABDOMEN:  Soft.  Abdomen is nontender.  No organomegaly or masses.



DIAGNOSTIC DATA:  Abdominal sonogram shows no pathology.  Her liver function tests

remain elevated.  The bilirubin is 1.4, AST 3458, ALT 1609, alkaline phosphatase

127.  CBC shows mild anemia. Her abdominal sonogram shows no liver masses or any

dilation of common bile duct. 



CLINICAL IMPRESSION:  

1. Acute liver injury, most likely hypoxic.  Her abdominal sonogram and hepatitis

markers are negative. 



RECOMMENDATIONS:  

1. Supportive care.

2. May consider PEG tube placement in the next 24 to 48 hours.

3. She will be supposed to come to the [facility] for a PEG tube placement on Monday, but

now she is in the hospital, may consider PEG tube before discharge. 







Job ID:  [id number]

## 2019-04-20 NOTE — CON
DATE OF CONSULTATION:  



HISTORY OF PRESENT ILLNESS:  Ms. Álvarez is an 79-year-old white female, who was

initially admitted for mental status change.  Based on the ER report, the patient

initially came in with some mild shortness of breath.  She was also noted to be

confused at that time.  Please note, this patient is from the nursing home.  We are

being consulted for her acute kidney injury.  Previous renal function has been

reported as normal.  Review of the urinalysis suggests that she may be dehydrated.

She has no evidence of acute tubular necrosis based on the last urinalysis.  This

morning, I examined the patient and she has decreased mentation.  She is unable to

converse with me. 



She also was noted to have a mild hyperkalemia.  Kayexalate has been ordered.



REVIEW OF SYSTEMS:  Not obtainable since the patient is unresponsive to verbal

stimuli. 



PAST MEDICAL HISTORY:  Shows the following:  History of dementia, Parkinson disease,

type 2 diabetes mellitus, GERD, hyperlipidemia, hypertension, osteoporosis, chronic

atrial fibrillation, DJD, history of status post pneumonia, and status post CHF. 



PAST SURGICAL HISTORY:  Status post pacemaker placement, status post bilateral hip

replacement. 



SOCIAL HISTORY:  The patient is a nursing home patient.  Currently, no tobacco or

alcohol use.  No drug abuse. 



ALLERGIES:  SEROQUEL AND LACTOSE.



TRAUMA:  None.



IMMUNIZATION:  Up-to-date.



HOSPITALIZATIONS:  Please see past medical history.



FAMILY HISTORY:  Noncontributory.



PHYSICAL EXAMINATION:

VITAL SIGNS:  Blood pressure is noted at 126/64 with a heart rate of 85, respiratory

rate 22, and pulse ox 95% on 4 L and temperature 99.2. 

GENERAL:  The patient is supine, comfortable, not responsive to verbal stimuli. 

SKIN:  Decreased turgor. 

HEENT:  Slightly pale conjunctivae.  Anicteric sclerae. 

NECK:  No neck mass.  No carotid bruits.  No JVD. 

CHEST:  No deformities. 

LUNGS:  Decreased breath sounds. 

HEART:  Irregular.  No murmur, no gallops, no rubs. 

ABDOMEN:  Globular, soft, nontender.  No masses. 

EXTREMITIES:  No edema.  No deformities. 

NEUROLOGICAL:  Unresponsive to verbal stimuli.



MEDICATIONS:  Medications of April 20, 2019, Humulin R sliding scale, Ativan 1 mg IV

q.6 p.r.n., Zosyn 2.25 g IV q.6h., normal saline 110 mL/h, and Kayexalate 30 g. 



LABORATORY DATA:  April 20, 2019; white count 4.3, hemoglobin 8, sodium 136,

potassium 6.2, chloride 107, carbon dioxide 14, BUN 58, creatinine 3.13, glucose 80,

AST is 3458, ALT 1609, albumin is 2.6. 



Urinalysis of April 19, 2019, specific gravity 1.020.  No pigmented granular casts. 



Urine creatinine noted at 110.  Urine sodium is 30.  Fractional excretion of sodium

is about 1%. 



ASSESSMENT AND PLAN:  

1. Acute kidney injury, consider hemodynamically mediated renal dysfunction.

Previous urinalyses have been very concentrated.  The patient is receiving empiric

volume repletion.  However, in spite of the volume repletion, the patient's renal

function remains unimproved, making the possibility of an acute tubular necrosis a

likelihood.  However, we will give albumin infusion to see if we could still further

improve the patient's renal dysfunction.  Albumin 25 g IV q.6h. has been ordered.

There is no indication for any dialytic intervention.  Please note, I do not feel

that this patient is a good dialysis candidate. 

2. Finding of hector lesions in the liver, consideration for underlying cirrhosis

remains with this patient.  An ammonia level will also be checked today. 

3. Mental status change, secondary to presumed metabolic encephalopathy.  This is

not from uremia.  We need to check the patient's ammonia level due to history of ?

of cirrhosis by ultrasound and elevated liver function tests. 

4. Overall agree with current management.  Continue supportive care.







Job ID:  [id number]

## 2019-04-21 LAB
ALBUMIN SERPL BCG-MCNC: 3.4 G/DL (ref 3.4–4.8)
ALP SERPL-CCNC: 299 U/L (ref 40–150)
ALT SERPL W P-5'-P-CCNC: 920 U/L (ref 8–55)
ANION GAP SERPL CALC-SCNC: 20 MMOL/L (ref 10–20)
APTT PPP: 49.3 SEC (ref 22.9–36.1)
AST SERPL-CCNC: 868 U/L (ref 5–34)
BILIRUB SERPL-MCNC: 1.7 MG/DL (ref 0.2–1.2)
BUN SERPL-MCNC: 67 MG/DL (ref 9.8–20.1)
CALCIUM SERPL-MCNC: 8.5 MG/DL (ref 7.8–10.44)
CHLORIDE SERPL-SCNC: 112 MMOL/L (ref 98–107)
CO2 SERPL-SCNC: 15 MMOL/L (ref 23–31)
CREAT CL PREDICTED SERPL C-G-VRATE: 14 ML/MIN (ref 70–130)
GLOBULIN SER CALC-MCNC: 2.6 G/DL (ref 2.4–3.5)
GLUCOSE SERPL-MCNC: 97 MG/DL (ref 83–110)
HGB BLD-MCNC: 7.4 G/DL (ref 12–16)
INR PPP: 4.1
MCH RBC QN AUTO: 29.7 PG (ref 27–31)
MCV RBC AUTO: 94.9 FL (ref 78–98)
MDIFF COMPLETE?: YES
PLATELET # BLD AUTO: 160 THOU/UL (ref 130–400)
POTASSIUM SERPL-SCNC: 5.2 MMOL/L (ref 3.5–5.1)
PROTHROMBIN TIME: 39.5 SEC (ref 12–14.7)
RBC # BLD AUTO: 2.51 MILL/UL (ref 4.2–5.4)
SODIUM SERPL-SCNC: 142 MMOL/L (ref 136–145)
WBC # BLD AUTO: 3.9 THOU/UL (ref 4.8–10.8)

## 2019-04-21 RX ADMIN — ALBUMIN HUMAN SCH GM: 250 SOLUTION INTRAVENOUS at 22:09

## 2019-04-21 RX ADMIN — PIPERACILLIN SODIUM, TAZOBACTAM SODIUM SCH MLS: 2; .25 INJECTION, POWDER, LYOPHILIZED, FOR SOLUTION INTRAVENOUS at 08:49

## 2019-04-21 RX ADMIN — ALBUMIN HUMAN SCH GM: 250 SOLUTION INTRAVENOUS at 05:09

## 2019-04-21 RX ADMIN — PIPERACILLIN SODIUM, TAZOBACTAM SODIUM SCH MLS: 2; .25 INJECTION, POWDER, LYOPHILIZED, FOR SOLUTION INTRAVENOUS at 01:27

## 2019-04-21 RX ADMIN — ALBUMIN HUMAN SCH GM: 250 SOLUTION INTRAVENOUS at 17:35

## 2019-04-21 RX ADMIN — ALBUMIN HUMAN SCH GM: 250 SOLUTION INTRAVENOUS at 12:15

## 2019-04-21 RX ADMIN — Medication SCH: at 09:01

## 2019-04-21 RX ADMIN — Medication SCH ML: at 19:45

## 2019-04-21 RX ADMIN — PIPERACILLIN SODIUM, TAZOBACTAM SODIUM SCH MLS: 2; .25 INJECTION, POWDER, LYOPHILIZED, FOR SOLUTION INTRAVENOUS at 19:45

## 2019-04-21 RX ADMIN — PIPERACILLIN SODIUM, TAZOBACTAM SODIUM SCH MLS: 2; .25 INJECTION, POWDER, LYOPHILIZED, FOR SOLUTION INTRAVENOUS at 15:28

## 2019-04-21 NOTE — PDOC.FM
- Subjective


Subjective: 





80 yo female seen at bedside this AM. Patient is lying in bed making 

incomprehensible sounds. Patient does not follow commands or respond to 

questions.





Per nursing staff, patient has been unchanged. They note that the ativan makes 

her more relaxed and not as agitated. No other history is able to be obtained. 





- Objective


Vital Signs & Weight: 


 Vital Signs (12 hours)











  Temp Pulse Resp BP Pulse Ox


 


 04/21/19 03:45  98.1 F  71  25 H  108/54 L  100


 


 04/20/19 23:50  98.2 F  68  27 H  116/67  98


 


 04/20/19 19:40  98.8 F  74  20  109/52 L  97








 Weight











Weight                         65.045 kg














I&O: 


 











 04/19/19 04/20/19 04/21/19





 06:59 06:59 06:59


 


Intake Total  3460 2719


 


Output Total   1


 


Balance  3460 2718











Result Diagrams: 


 04/21/19 05:26





 04/21/19 05:26





Phys Exam





- Physical Examination


HEENT: moist MMs


Respiratory: no wheezing, clear to auscultation bilateral


Cardiovascular: RRR, no significant murmur


Gastrointestinal: soft, non-tender, no distention, positive bowel sounds


Musculoskeletal: no edema


Neurological: moves all 4 limbs


Deviation from normal: A&O x 0 


Skin: no rash





Dx/Plan


(1) Encephalopathy acute


Code(s): G93.40 - ENCEPHALOPATHY, UNSPECIFIED   Status: Acute   





(2) Shock liver


Code(s): K72.00 - ACUTE AND SUBACUTE HEPATIC FAILURE WITHOUT COMA   Status: 

Acute   





(3) Hyperkalemia


Code(s): E87.5 - HYPERKALEMIA   Status: Acute   





(4) Acute kidney injury superimposed on CKD


Code(s): N17.9 - ACUTE KIDNEY FAILURE, UNSPECIFIED; N18.9 - CHRONIC KIDNEY 

DISEASE, UNSPECIFIED   Status: Acute   





(5) Supratherapeutic INR


Code(s): R79.1 - ABNORMAL COAGULATION PROFILE   Status: Acute   





(6) Diabetes type 2, controlled


Code(s): E11.9 - TYPE 2 DIABETES MELLITUS WITHOUT COMPLICATIONS   Status: 

Chronic   





(7) HTN (hypertension)


Code(s): I10 - ESSENTIAL (PRIMARY) HYPERTENSION   Status: Chronic   





- Plan


Plan: 





Acute metabolic encephalopathy:


-  ddx: 2/2 infection vs. dehydration vs. metabolic derangement


-  Urine culture and blood culture no growth to date 


-  Procal was 1.0 with hx of high aspiration risk, Continue zosyn


-  Hospice care consult place, likely no intervention until Monday





Hyperkalemia:


-  No EKG changes


-  Will give kayex [place], and calcium chloride as needed


-  Monitor in tele, repeat this morning is 5.2





Shock Liver:


- Elevated AST/ALT


- Improved today


- Multi system organ failure





Transaminitis


-  Likely 2/2 to previous hypotension


-  Repeat CMP shows sustained elevation


-  RUQ US may show cirrhosis


-  Hepatitis panel negative





NIKHIL on CKD


-  Creat 3.35, baseline <1


-  Likely due to above 


-  Unclear etiology


-  Nephrology consulted, started albumin. 





Supratherapeutic INR


-  On eliquis for afib 


-  Daily coag panel


-  INR this AM 4.1





DM2


-  Currently NPO





HTN


-  hold antihypertensives due to low BPs








Disposition: Poor, will continue current plan of care following MPOA wishes.

## 2019-04-21 NOTE — PDOC.EVN
Event Note





- Event Note


Event Note: 





Transition of Care Note 4/21/19





Admission Date 4/18/19 Please see documentation prior to 4/20 for further 

history. 


80 yo female presents from NH via EMS for SOB. Per NH staff, patient became SOB 

at NH and required supplemental O2 by non-rebreather mask. Patient was then 

able to maintain saturations on 3L NC when her baseline is 2L at home. Patient 

was unable to provide any further history. Patient is a DNAR. Her brother, JENNIFER

, has been available to provide further history and guidance. 





On the afternoon of 4/19/19 patient had CODE GREEN called due to bradycardia, 

hypotension, and inability to get rectal temp. Patient was further altered and 

overall not doing well. She was given Atropine and IVF with improvement of her 

vital signs and a very short term improvement in her mentation. Later on in the 

afternoon, she returned to only making incomprehensible sounds and not 

following commands. Her DNAR status was confirmed with her brother again at 

that time. 





Since admission patient has continued to have elevated Creatinine when her 

baseline is less than 1. She also likely had shock liver from her hypotension 

episodes with severe elevations in her ALT and AST that have since downtrended. 

Patient's baseline mentation has not improved consistently and her brother has 

been more receptive to the idea of de-escalating care. She was scheduled for 

outpatient PEG tube placement on 4/22/19; however, now due to her current state 

her brother would not want to pursue that and is in agreement with hospice 

consultation. 





Her clinical picture is unclear and due to her overall prognosis care is most 

likely going to be de-escalated to Hospice Care in near future. Her brother was 

requesting Hospice placement in [place] if at all possible. Please contact 

with any questions or concerns.

## 2019-04-21 NOTE — PRG
DATE OF SERVICE:  04/21/2019



SUBJECTIVE:  Ms. Álvarez is an 79-year-old white female, who was seen by the Renal

Service for acute kidney injury.  Initially, we felt that this is all

hemodynamically mediated renal dysfunction.  She has received volume

repletion-crystalloids and colloids.  However, renal function still remains

relatively unimproved.  Urine sediment did not suggest any acute tubular necrosis.

The patient still remains unresponsive.  Please note, ammonia level is within

normal. 



OBJECTIVE:  VITAL SIGNS:  Blood pressure 128/63, heart rate 83, respiratory rate

rate 20, temperature 97.6, pulse ox 98%. 

GENERAL:  Unresponsive, comfortable, not in overt distress. 

SKIN:  Adequate turgor. 

HEENT:  She has slightly pale conjunctivae.  Anicteric sclerae. 

NECK:  No neck mass.  No carotid bruits.  No JVD. 

CHEST:  No deformities. 

LUNGS:  Decreased breath sounds. 

HEART:  Normal sinus rhythm.  No murmur, no gallops, no rubs. 

ABDOMEN:  Globular, soft, nontender. 

EXTREMITIES:  Trace edema.



MEDICATIONS:  Medications of April 21, 2019, reviewed.



LABORATORY DATA:  Laboratories of April 21, 2019; white count 3.9, hemoglobin 7.4.

Sodium 142, potassium 5.2, chloride 112, carbon dioxide 15, BUN 67, creatinine 3.35.

 , , alkaline phosphatase 299, albumin 3.4. 



ASSESSMENT AND PLAN:  

1. Acute kidney injury-a presumptive hemodynamically-mediated renal dysfunction.

Relatively unchanged.  Continue current IV hydration.  No indication for any

dialytic intervention. 

2. Mild hyperkalemia, improved with Kayexalate.

3. Anemia, p.r.n. blood transfusion.

4. Elevated liver function test-acute hepatic injury, unclear etiology.

5. Metabolic acidosis.  Bicarbonate drip has been started with this patient.

Continue supportive care.  Overall, prognosis remains poor. 







Job ID:  [id number]

## 2019-04-21 NOTE — PRG
DATE OF SERVICE:  04/21/2019



SUBJECTIVE:  This is an 79-year-old  female, who was supposed to see me

tomorrow for an EGD and PEG tube placement.  In the meantime, she developed some

hypotension, shock, and difficulty breathing.  She was brought to the ER and

hospitalized.  She was found to have evidence of acute liver injury with

transaminase more than 3000 and also her BUN was around 58.  Her liver function

tests are actually coming down today.  On admission, the AST was more than 3000 and

ALT was close to 3000.  Today's lab result shows marked improvement.  The AST is

down to 868, ALT down to 920, alkaline phosphatase 299, and bilirubin is 1.7.

However, kidney function is getting worse.  The BUN is around 67 and creatinine is

3.35.  She was seen by Dr. Rodríguez for Nephrology Service.  He ordered some IV albumin

and also IV fluids.  After all the IV fluids and IV albumin, her kidney function is

getting worse.  His impression is that she has most likely ATN. 



OBJECTIVE:  GENERAL:  She is very sleepy and does not wake up, but she got some IV

Ativan this morning. 

VITAL SIGNS:  Afebrile, pulse is 83, and blood pressure 128/63. 

CARDIOVASCULAR SYSTEM:  Within normal limits. 

LUNGS:  Within normal limits. 

ABDOMEN:  Soft.  Abdomen is nondistended.  Abdomen is nontender.  She has bowel

sounds active. 



ASSESSMENT AND PLAN:  I had a long talk with the patient's brother, Mr. Arevalo and

he was worried about whether she want to make it or she need to go to hospice.  I

answered all his questions and findings of liver is getting better, most likely

acute liver injury because of hypotension.  He was also told about the worsening

kidney function.  At the present time, the plan is to treat her with IV fluids and

supportive care.  I will also hold off the Ativan to see if she wakes up.  I am not

sure at the present time about the G-tube placement.  We will watch her for another

day or 2 and decide whether she needs a G-tube.  This was informed to the patient's

brother. 







Job ID:  [id number]

## 2019-04-21 NOTE — PRG
DATE OF SERVICE:  04/21/2019



SUBJECTIVE:  Ms. Álvarez is basically a comatose with labored breathing.  Family has

decided to do no resuscitative efforts or PEG tubing.  Her prognosis is poor. 







Job ID:  [id number]

## 2019-04-22 VITALS — DIASTOLIC BLOOD PRESSURE: 56 MMHG | SYSTOLIC BLOOD PRESSURE: 120 MMHG | TEMPERATURE: 98.8 F

## 2019-04-22 RX ADMIN — PIPERACILLIN SODIUM, TAZOBACTAM SODIUM SCH MLS: 2; .25 INJECTION, POWDER, LYOPHILIZED, FOR SOLUTION INTRAVENOUS at 00:59

## 2019-04-22 NOTE — PRG
DATE OF SERVICE:  04/20/2019



ADDENDUM:  This is an addendum to the note of Dr. Michele Parekh. 



Ms. Álvarez had a real setback yesterday with some hypotension as well as

bradycardia.  Her brother has elected for palliative and hospice type care.  She is

currently obtunded in her bed, but otherwise appearing to be in no respiratory

distress.  She demonstrates elevations of her liver enzymes as well as worsening

NIKHIL, likely related to her hypotensive bradycardic episode.  Her overall prognosis

remains extremely poor. 







Job ID:  [id number]

## 2019-04-22 NOTE — PDOC.EVN
Event Note





- Event Note


Event Note: 





S: resident team called to bedside at 0215 as nursing staff determined pt was 

passing away. On arrival pt was with closed eyes and observed to be taking no 

breaths.





O: HR 0, RR 0. On auscultation no heart or breath sounds, pupils bilaterally at 

4mm with no reaction to light.





A/P: Time of death 0211 on 2019, multifactoral in etiology, likely 

cardiomyopathy and liver failure. Will release body to  home.

## 2019-04-22 NOTE — CON
DATE OF CONSULTATION:  04/19/2019



REFERRING DOCTOR:  Dr. Aidan Go, Hospitalist Service.



REASON FOR CONSULTATION:  Abnormal LFTs, indicative of acute liver injury.



HISTORY OF PRESENT ILLNESS:  Ms. Catrina Álvarez is an 79-year-old  female, who

is a nursing home resident. The patient was supposed to have an EGD and PEG tube

placement done by me on Monday at [facility]. The patient was brought to

the ER yesterday because of acute dyspnea, hypotension,  etc. Her clinical status

markedly improved. This morning, she is awake and does respond to some questions,

but she thinks she has dementia. She cannot give any proper history. She is not

short of breath and appears very comfortable. On admission, she was found to have

markedly abnormal liver function tests. The lab data shows AST more than 3500, ALT

is 1906, alkaline phosphatase 390. Apparently, she has mild alteral mental status

yesterday, but today she is actually very awake and does respond to questions, but

not able to give a brief history. However, she denies having any abdominal pain,

nausea, vomiting etc. The admitting history and physical indicated she was

hypotensive with blood pressure 90/60 for a while. I am not sure if she had this

hypotension persistent or just transit. There is no other relevant history. 



MEDICAL ILLNESS:  

1. Dementia.

2. Parkinson disease.

3. Bipolar disorder.

4. Atrial fibrillation, status post pacemaker implant.

5. Diabetes type 2.

6. Chronic acid reflux.

7. Hyperlipidemia.

8. Hypertension.

9. Osteoporosis.



PAST SURGICAL HISTORY:  Bilateral hip replacement.



FAMILY HISTORY:  Mother had coronary artery disease.



SOCIAL HISTORY:  The patient is a nursing home resident. No history of smoking or

alcohol intake recently. 



MEDICATION LIST:  Reviewed. She is on;

1. Atorvastatin.

2. Dicyclomine.

3. Meclizine.

4. Oxybutynin.

5. Potassium chloride.

6. Sertraline.

7. Buspirone.

8. Metformin.

9. Amoxicillin.

10. __________.

11. Metoprolol.



REVIEW OF SYSTEMS:  Not obtainable.



PHYSICAL EXAMINATION:

GENERAL: She is a very thin,  female, appears comfortable, in no acute

distress. She is awake, but does not answer questions properly. 

VITAL SIGNS: Afebrile. Pulse 63, blood pressure is 99/76. 

HEENT: Conjunctivae clear. 

NECK: Supple. No adenitis or thyromegaly noted. 

CARDIOVASCULAR: First and second heart sounds normal. 

LUNGS: Clear to auscultation. 

ABDOMEN: Soft. Abdomen is nondistended. Abdomen is nontender. There is no

organomegaly. 

EXTREMITIES: Reveal no edema.



LABORATORY DATA:  Shows sodium 133, potassium 5.5, chloride 103, bicarb 18, BUN is

50, creatinine 2.59, glucose 144, calcium 8.8, bilirubin 0.7. AST more than 3500,

ALT 1906, alkaline phosphatase 390. TSH 4.97, which is high. Albumin is 2.7. CBC

shows anemia. The hemoglobin is 9.2, hematocrit 29.3, MCV 93.7, platelet count is

200321, polymorphs 68, bands 2, lymphocytes 14, WBC 4900. The abdominal sonogram

shows  __________ liver cirrhosis. 



Normal CBD. The  sonogram was basically normal.



CLINICAL IMPRESSION:  Markedly elevated AST and ALT, indicating acute liver injury.

The patient most likely has hypotension causing ischemic liver injury. It is also

possible  __________ drug-induced liver injury. She does take  __________ very

benign. 



RECOMMENDATION:  

1. Follow up LFTs.

2. Symptomatic treatment.







Job ID:  [id number]

## 2019-04-25 NOTE — DIS
DATE OF ADMISSION:  2019



DATE OF DISCHARGE:  2019



RESIDENT:  Michele Parekh MD



DATE OF DEATH:  2019.



TIME OF DEATH:  0211 hours.



CAUSE OF DEATH:  

1. Multisystem organ failure secondary to shock, unknown cause.

2. Shock liver.

3. Acute renal failure.



SECONDARY DIAGNOSES:  

1. Encephalopathy.

2. Hyperkalemia.

3. Supratherapeutic INR.

4. Type 2 diabetes.

5. Hypertension.



HOSPITAL COURSE:  The patient was an 79-year-old nursing home patient, brought by

EMS for shortness of breath.  The patient became short of breath, required a

non-rebreather to maintain O2 saturation.  She was then titrated down on her oxygen

requirement down to her baseline at 2 L.  In the emergency room, she was found to be

hyperkalemic at 6.1. 



During this hospitalization, the residents were called to the bedside for a code

green at approximately 12:45 on  due to a low heart rate in the 40s and 50s

as well as the patient being unresponsive to questions and agonal breathing.  The

patient was given a dose of atropine for heart rate improvement and a very mild

improvement of her mentation status; however, that soon deteriorated again to

unresponsiveness to questions and agonal breathing.  The patient's medical

power-of- who was her brother was contacted and stated the overall poor

prognosis and that he did confirm that she was a DNAR.  The patient's brother was

hoping to get her to Monday to a PEG tube placement and hopefully to improve her

overall nutrition status and overall health.  However, through further discussions,

it was decided that, that would be delayed at this time to see if she can recover

from her acute illness.  Her brother was told how grim her overall prognosis was.

On 2019, the patient's brother was made aware of the poor prognosis again and

Dr. Reddy with Gastroenterology decided to hold off on a PEG tube placement on

 and then Dr. Rodríguez with Nephrology was consulted due to her acute kidney injury,

however, could not make any recommendations due to her poor deterioration.  On

2019 at approximately 2 in the morning, residents were called to the bedside

due to the patient had passed away.  A progress note of death was completed by Dr. Amado Persaud noting the time of death at 0211 hours on 2019, and the body

was released to the  home. 







Job ID:  [id number]

## 2022-09-15 NOTE — DIS
DATE OF ADMISSION:  04/02/2019



DATE OF DISCHARGE:  04/05/2019



RESIDENT:  López Mancia MD



ADMITTING ATTENDING:  Ras Christensen MD



DISCHARGE ATTENDING:  Beata Simmons MD



CONSULT:  None.



PROCEDURES:  None.



PRIMARY DIAGNOSIS:  Aspiration pneumonia.



SECONDARY DIAGNOSES:  History of cerebrovascular accident, history of dementia,

atrial fibrillation, acute hypoxemic respiratory failure secondary to fluid 
overload

and aspiration pneumonia resolved, mild hyponatremia, normocytic anemia, 
elevated

liver enzymes. 



HISTORY OF PRESENT ILLNESS/HOSPITAL COURSE:  This is an 79-year-old female, who

presented from the nursing home with 2 weeks of progressive worsening cough.  
The

patient has a mild expressive aphasia, but this is reported to be at baseline.  
She

had subjective chills and one episode of vomiting.  She denied fevers or sick

contacts.  She is noted to have an oxygen saturation of 84% at the nursing home 
when

she was sent over.  Throughout the course of the stay, the patient was treated 
with

Zosyn and improved significantly.  Blood cultures from the ER were negative 
after 2

days.  The patient had a modified barium swallow eval for aspiration risk.  
Speech

therapies in their note stated that the patient was not safe with any form of

liquids or solids.  She was aspirating with all types.  Long discussion was had 
with

the patient and her brother regarding thinking about G-tube or continue with 
comfort

feeds.  The patient states that she was not at all interested in having a G-
tube but

she had a brother, who had that for 7 to 8 years and she saw the languish that 
he

went through.  The patient understands the risks of future aspiration pneumonia.

She states she does want to be DNR DNI.  Palliative care team was consulted to

discuss the option of hospice with the patient.  Their note will follow. 



Additionally, the patient developed tachycardia during the stay  her heart

rate was in the 110s after adding metoprolol 12.5 mg b.i.d. to the amiodarone 
that

she was already taking.  Her heart rate was in the low one 100s.  She denied any

chest pain or shortness of breath or palpitations.  The plan was discussed with 
her

PCP, Dr. Faheem Montaño. 



DISPOSITION:  Stable.



DISCHARGE INSTRUCTIONS:  

1. Location:  [facility] [place].

2. Diet:  Pureed by spoonful.

3. Activity:  As tolerated.

4. Followup:  Dr. Faheem Montaño in 3 to 5 days. 

Palliative care team will follow up before leaving the hospital.  The patient 
will

need to decide if she is interested in hospice.  The patient was undecided on

whether or not she would want readmission should she develop another aspiration

pneumonia at this time. 







Job ID:  [id number]



[employer] FMLA or Disability Forms were received and faxed to the Forms Completion Department today at [phone]. (Please include all appropriate authorization forms with your fax).    Did you have the patient complete (in full) and sign the “Authorization For Disclosure of Health Information Forms Completion” form? Yes    Have you communicated that the Forms Completion Process may take up to 14 days? Yes    If you have questions about this encounter, please contact the Forms Completion Dept at [phone], Option 3.